# Patient Record
Sex: FEMALE | Race: WHITE | NOT HISPANIC OR LATINO | Employment: OTHER | URBAN - METROPOLITAN AREA
[De-identification: names, ages, dates, MRNs, and addresses within clinical notes are randomized per-mention and may not be internally consistent; named-entity substitution may affect disease eponyms.]

---

## 2017-09-05 ENCOUNTER — HOSPITAL ENCOUNTER (EMERGENCY)
Facility: OTHER | Age: 47
Discharge: HOME | End: 2017-09-05
Attending: EMERGENCY MEDICINE
Payer: MEDICARE

## 2017-09-05 ENCOUNTER — NURSE TRIAGE (OUTPATIENT)
Dept: ADMINISTRATIVE | Facility: CLINIC | Age: 47
End: 2017-09-05

## 2017-09-05 VITALS
OXYGEN SATURATION: 98 % | TEMPERATURE: 98 F | WEIGHT: 118 LBS | BODY MASS INDEX: 23.16 KG/M2 | DIASTOLIC BLOOD PRESSURE: 82 MMHG | RESPIRATION RATE: 17 BRPM | HEIGHT: 60 IN | SYSTOLIC BLOOD PRESSURE: 135 MMHG | HEART RATE: 92 BPM

## 2017-09-05 DIAGNOSIS — N83.209 HEMORRHAGIC OVARIAN CYST: Primary | ICD-10-CM

## 2017-09-05 LAB
B-HCG UR QL: NEGATIVE
BACTERIA #/AREA URNS HPF: ABNORMAL /HPF
BILIRUB UR QL STRIP: ABNORMAL
CLARITY UR: ABNORMAL
COLOR UR: YELLOW
CTP QC/QA: YES
GLUCOSE UR QL STRIP: NEGATIVE
HGB UR QL STRIP: ABNORMAL
HYALINE CASTS #/AREA URNS LPF: 5 /LPF
KETONES UR QL STRIP: NEGATIVE
LEUKOCYTE ESTERASE UR QL STRIP: ABNORMAL
MICROSCOPIC COMMENT: ABNORMAL
NITRITE UR QL STRIP: NEGATIVE
PH UR STRIP: 6 [PH] (ref 5–8)
PROT UR QL STRIP: ABNORMAL
RBC #/AREA URNS HPF: 1 /HPF (ref 0–4)
SP GR UR STRIP: >=1.03 (ref 1–1.03)
URN SPEC COLLECT METH UR: ABNORMAL
UROBILINOGEN UR STRIP-ACNC: NEGATIVE EU/DL
WBC #/AREA URNS HPF: 50 /HPF (ref 0–5)

## 2017-09-05 PROCEDURE — 99284 EMERGENCY DEPT VISIT MOD MDM: CPT | Mod: 25

## 2017-09-05 PROCEDURE — 63600175 PHARM REV CODE 636 W HCPCS: Performed by: EMERGENCY MEDICINE

## 2017-09-05 PROCEDURE — 81025 URINE PREGNANCY TEST: CPT | Performed by: PHYSICIAN ASSISTANT

## 2017-09-05 PROCEDURE — 81000 URINALYSIS NONAUTO W/SCOPE: CPT

## 2017-09-05 PROCEDURE — 25000003 PHARM REV CODE 250: Performed by: PHYSICIAN ASSISTANT

## 2017-09-05 PROCEDURE — 87086 URINE CULTURE/COLONY COUNT: CPT

## 2017-09-05 PROCEDURE — 87088 URINE BACTERIA CULTURE: CPT

## 2017-09-05 RX ORDER — ONDANSETRON 8 MG/1
8 TABLET, ORALLY DISINTEGRATING ORAL EVERY 8 HOURS PRN
Qty: 12 TABLET | Refills: 0 | Status: ON HOLD | OUTPATIENT
Start: 2017-09-05 | End: 2019-04-22

## 2017-09-05 RX ORDER — OXYCODONE HCL 5 MG/5 ML
10 SOLUTION, ORAL ORAL
Status: COMPLETED | OUTPATIENT
Start: 2017-09-05 | End: 2017-09-05

## 2017-09-05 RX ORDER — NYSTATIN 100000 U/G
CREAM TOPICAL 2 TIMES DAILY
Status: ON HOLD | COMMUNITY
End: 2019-04-22

## 2017-09-05 RX ORDER — OXYCODONE HYDROCHLORIDE 5 MG/1
10 TABLET ORAL
Status: DISCONTINUED | OUTPATIENT
Start: 2017-09-05 | End: 2017-09-05

## 2017-09-05 RX ORDER — IBUPROFEN 600 MG/1
600 TABLET ORAL
Status: DISCONTINUED | OUTPATIENT
Start: 2017-09-05 | End: 2017-09-05

## 2017-09-05 RX ORDER — PANTOPRAZOLE SODIUM 40 MG/1
40 TABLET, DELAYED RELEASE ORAL DAILY
COMMUNITY

## 2017-09-05 RX ORDER — METHOCARBAMOL 500 MG/1
1000 TABLET, FILM COATED ORAL
Status: COMPLETED | OUTPATIENT
Start: 2017-09-05 | End: 2017-09-05

## 2017-09-05 RX ORDER — OXYCODONE AND ACETAMINOPHEN 5; 325 MG/1; MG/1
2 TABLET ORAL
Status: DISCONTINUED | OUTPATIENT
Start: 2017-09-05 | End: 2017-09-05

## 2017-09-05 RX ORDER — TOLTERODINE 4 MG/1
4 CAPSULE, EXTENDED RELEASE ORAL DAILY
Status: ON HOLD | COMMUNITY
End: 2019-04-22

## 2017-09-05 RX ORDER — OXYCODONE HCL 5 MG/5 ML
10 SOLUTION, ORAL ORAL
Status: DISCONTINUED | OUTPATIENT
Start: 2017-09-05 | End: 2017-09-05

## 2017-09-05 RX ORDER — ACETAMINOPHEN 500 MG
500 TABLET ORAL
Status: COMPLETED | OUTPATIENT
Start: 2017-09-05 | End: 2017-09-05

## 2017-09-05 RX ADMIN — ACETAMINOPHEN 500 MG: 500 TABLET ORAL at 03:09

## 2017-09-05 RX ADMIN — METHOCARBAMOL 1000 MG: 500 TABLET ORAL at 03:09

## 2017-09-05 RX ADMIN — OXYCODONE HYDROCHLORIDE 10 MG: 5 SOLUTION ORAL at 08:09

## 2017-09-05 NOTE — ED PROVIDER NOTES
Encounter Date: 9/5/2017       History     Chief Complaint   Patient presents with    Back Pain     Pt CO bilateral LBP Xs 3.5 hours.     Patient is a 48 y/o white female with fibromyalgia, arthritis and Crohn's disease who presents to the ED with back pain onset 3 hours ago. She states she was watching TV when she moved a certain way and experienced a sharp pain in her lower back radiating to her right side. Her pain is exacerbated with bending over. She took her Oxycodone without relief. She denies fever, chills, dysuria, difficulty ambulating, leg dragging or loss of bowel bladder incontinence.       The history is provided by the patient.     Review of patient's allergies indicates:   Allergen Reactions    Imuran [azathioprine sodium] Other (See Comments)     pancreatitis    Penicillins Hives     Past Medical History:   Diagnosis Date    Arthritis     Crohn disease     Fibromyalgia      Past Surgical History:   Procedure Laterality Date    COLON SURGERY      ILEOSTOMY       No family history on file.  Social History   Substance Use Topics    Smoking status: Not on file    Smokeless tobacco: Not on file    Alcohol use Not on file     Review of Systems   Constitutional: Negative for chills and fever.   HENT: Negative for congestion and sore throat.    Eyes: Negative for visual disturbance.   Respiratory: Negative for shortness of breath.    Cardiovascular: Negative for chest pain.   Gastrointestinal: Negative for abdominal pain and nausea.   Genitourinary: Negative for dysuria and hematuria.   Musculoskeletal: Positive for back pain.   Skin: Negative for rash.   Neurological: Negative for weakness and light-headedness.   Hematological: Does not bruise/bleed easily.       Physical Exam     Initial Vitals [09/05/17 1511]   BP Pulse Resp Temp SpO2   124/75 (!) 113 18 98.8 °F (37.1 °C) 95 %      MAP       91.33         Physical Exam    Vitals reviewed.  Constitutional: She is cooperative.   White female  sitting upright in chair in no acute distress. Speaking in clear and full sentences and ambulates to the bed with ease.    HENT:   Head: Normocephalic and atraumatic.   Eyes: EOM are normal. Pupils are equal, round, and reactive to light.   Neck: Normal range of motion and full passive range of motion without pain. Neck supple.   Cardiovascular: Regular rhythm and intact distal pulses.   Tachycardic at 105 bpm   Pulmonary/Chest: Breath sounds normal. No respiratory distress. She has no wheezes.   Abdominal: Soft. Bowel sounds are normal. There is no tenderness.   Ileostomy stoma in place    Musculoskeletal:        Cervical back: Normal.   Normal ROM in all extremities. No cervical or thoracic tenderness. No stepoffs, masses or obvious trauma. Paraspinal tenderness to lumbar region radiating to rigt lower back. Right sided CVA tenderness    Neurological: She is alert and oriented to person, place, and time. GCS eye subscore is 4. GCS verbal subscore is 5. GCS motor subscore is 6.   CN II-XII intact. Normal finger to nose movement. Normal gait. Strength 5/5 in all extremities.    Skin: Skin is warm and dry. No rash noted.         ED Course   Procedures  Labs Reviewed   URINALYSIS - Abnormal; Notable for the following:        Result Value    Appearance, UA Hazy (*)     Specific Gravity, UA >=1.030 (*)     Protein, UA 1+ (*)     Bilirubin (UA) 1+ (*)     Occult Blood UA 1+ (*)     Leukocytes, UA Trace (*)     All other components within normal limits   URINALYSIS MICROSCOPIC - Abnormal; Notable for the following:     WBC, UA 50 (*)     Bacteria, UA Few (*)     Hyaline Casts, UA 5 (*)     All other components within normal limits   CULTURE, URINE   POCT URINE PREGNANCY          X-Rays:     Medical Decision Making:   Initial Assessment:   Urgent evaluation of a 47 y.o. female with a medical history of Crohn's disease, fibromyalgia, and arthritis, presenting to the emergency department complaining of back pain. Patient is  afebrile, nontoxic appearing and hemodynamically stable.  ED Management:  Patient exam is likely secondary to musculoskeletal strain vs pyelonephritis vs renal stone.   U/A revealed 1+ occult blood, trace leukocytes, 1 + protein, 50 RBC, few bacteria. Consistent with pyelonephritis given significant left flank pain. Will scan to rule out renal stone.   Upon reevaluation patient's pain is now 7/10  Ordered oral hydration for tachycardia.  I have discussed this patient with the attending and she will resume care from this point.  Plan is pending CT renal stone study  Other:   I have discussed this case with another health care provider.       <> Summary of the Discussion: MD Simona Whitney MD              Attending Attestation:     Physician Attestation Statement for NP/PA:   I have conducted a face to face encounter with this patient in addition to the NP/PA, due to Medical Complexity    Other NP/PA Attestation Additions:    History of Present Illness: Patient with LBP that is bilateral but worse in the right. No recent fever or illness.    Physical Exam: Normal gait. No acute neurologic deficits.    Medical Decision Making: Patient UA was suspicious for UTI but she denied dysuria or further  complaints. UCx was sent and is pending. Her CT and US revealed a large hemorrhagic left ovarian cyst which is likely the cause of her acute presentation. CT also showed DJD of lumbar tawana which matches patient's history of onset with trunk rotation. She wanted to go home and was stable at the time of discharge. She was stable for outpatient management.                  ED Course      Clinical Impression:     1. Hemorrhagic ovarian cyst                                 Jeevan Sood PA-C  09/05/17 1955       Simona Mejia MD  09/06/17 4743

## 2017-09-05 NOTE — ED TRIAGE NOTES
Pt with back pain and sudden onset this after noon. Pain is to bilateral lower sides. Denies any injury/trauma.

## 2017-09-05 NOTE — TELEPHONE ENCOUNTER
Reason for Disposition   SEVERE back pain    Protocols used: ST BACK PAIN-A-[place]    Rani is calling to report severe back pain that came on suddenly.  Reports it is on both sides of her spine.  No primary care MD.  Advised ER/UC.

## 2017-09-07 LAB — BACTERIA UR CULT: NORMAL

## 2017-09-19 NOTE — PROGRESS NOTES
9:34 AM call patient states that she is not feeling better.  Informed urine culture that grew diphtheroids.  She states that she has an appointment with her doctor tomorrow and would like to defer treatment to her PCP.  She was given return precautions.  She states understanding. ROBERTO FRAUSTO

## 2019-04-20 ENCOUNTER — HOSPITAL ENCOUNTER (INPATIENT)
Facility: OTHER | Age: 49
LOS: 4 days | Discharge: HOME | DRG: 871 | End: 2019-04-24
Attending: EMERGENCY MEDICINE | Admitting: INTERNAL MEDICINE
Payer: MEDICARE

## 2019-04-20 DIAGNOSIS — A41.9 SEPSIS: ICD-10-CM

## 2019-04-20 DIAGNOSIS — K65.1 INTRA-ABDOMINAL ABSCESS: ICD-10-CM

## 2019-04-20 DIAGNOSIS — R82.71 ASYMPTOMATIC BACTERIURIA: ICD-10-CM

## 2019-04-20 DIAGNOSIS — K65.1 ABSCESS OF ABDOMINAL CAVITY: ICD-10-CM

## 2019-04-20 DIAGNOSIS — K50.114 CROHN'S DISEASE OF LARGE INTESTINE WITH ABSCESS: ICD-10-CM

## 2019-04-20 DIAGNOSIS — R53.81 DEBILITY: ICD-10-CM

## 2019-04-20 DIAGNOSIS — A41.9 SEPSIS, DUE TO UNSPECIFIED ORGANISM: Primary | ICD-10-CM

## 2019-04-20 DIAGNOSIS — M79.7 FIBROMYALGIA: Chronic | ICD-10-CM

## 2019-04-20 LAB
ALBUMIN SERPL BCP-MCNC: 3.2 G/DL (ref 3.5–5.2)
ALP SERPL-CCNC: 79 U/L (ref 55–135)
ALT SERPL W/O P-5'-P-CCNC: 8 U/L (ref 10–44)
ANION GAP SERPL CALC-SCNC: 15 MMOL/L (ref 8–16)
ANISOCYTOSIS BLD QL SMEAR: SLIGHT
AST SERPL-CCNC: 15 U/L (ref 10–40)
BASOPHILS # BLD AUTO: ABNORMAL K/UL (ref 0–0.2)
BASOPHILS NFR BLD: 0 % (ref 0–1.9)
BILIRUB SERPL-MCNC: 0.3 MG/DL (ref 0.1–1)
BUN SERPL-MCNC: 6 MG/DL (ref 6–20)
CALCIUM SERPL-MCNC: 9.7 MG/DL (ref 8.7–10.5)
CHLORIDE SERPL-SCNC: 102 MMOL/L (ref 95–110)
CO2 SERPL-SCNC: 23 MMOL/L (ref 23–29)
CREAT SERPL-MCNC: 0.9 MG/DL (ref 0.5–1.4)
CRP SERPL-MCNC: 51.4 MG/L (ref 0–8.2)
DIFFERENTIAL METHOD: ABNORMAL
EOSINOPHIL # BLD AUTO: ABNORMAL K/UL (ref 0–0.5)
EOSINOPHIL NFR BLD: 0 % (ref 0–8)
ERYTHROCYTE [DISTWIDTH] IN BLOOD BY AUTOMATED COUNT: 15.5 % (ref 11.5–14.5)
EST. GFR  (AFRICAN AMERICAN): >60 ML/MIN/1.73 M^2
EST. GFR  (NON AFRICAN AMERICAN): >60 ML/MIN/1.73 M^2
GLUCOSE SERPL-MCNC: 114 MG/DL (ref 70–110)
HCT VFR BLD AUTO: 41.7 % (ref 37–48.5)
HGB BLD-MCNC: 13.6 G/DL (ref 12–16)
LACTATE SERPL-SCNC: 2.2 MMOL/L (ref 0.5–2.2)
LYMPHOCYTES # BLD AUTO: ABNORMAL K/UL (ref 1–4.8)
LYMPHOCYTES NFR BLD: 14 % (ref 18–48)
MAGNESIUM SERPL-MCNC: 2.6 MG/DL (ref 1.6–2.6)
MCH RBC QN AUTO: 32.5 PG (ref 27–31)
MCHC RBC AUTO-ENTMCNC: 32.6 G/DL (ref 32–36)
MCV RBC AUTO: 100 FL (ref 82–98)
MONOCYTES # BLD AUTO: ABNORMAL K/UL (ref 0.3–1)
MONOCYTES NFR BLD: 4 % (ref 4–15)
NEUTROPHILS NFR BLD: 74 % (ref 38–73)
NEUTS BAND NFR BLD MANUAL: 8 %
PLATELET # BLD AUTO: 383 K/UL (ref 150–350)
PLATELET BLD QL SMEAR: ABNORMAL
PMV BLD AUTO: 10.3 FL (ref 9.2–12.9)
POTASSIUM SERPL-SCNC: 3.5 MMOL/L (ref 3.5–5.1)
PROT SERPL-MCNC: 8.4 G/DL (ref 6–8.4)
RBC # BLD AUTO: 4.18 M/UL (ref 4–5.4)
SODIUM SERPL-SCNC: 140 MMOL/L (ref 136–145)
WBC # BLD AUTO: 18.91 K/UL (ref 3.9–12.7)

## 2019-04-20 PROCEDURE — 86140 C-REACTIVE PROTEIN: CPT

## 2019-04-20 PROCEDURE — S0030 INJECTION, METRONIDAZOLE: HCPCS | Performed by: EMERGENCY MEDICINE

## 2019-04-20 PROCEDURE — 12000002 HC ACUTE/MED SURGE SEMI-PRIVATE ROOM

## 2019-04-20 PROCEDURE — 96376 TX/PRO/DX INJ SAME DRUG ADON: CPT

## 2019-04-20 PROCEDURE — 96375 TX/PRO/DX INJ NEW DRUG ADDON: CPT

## 2019-04-20 PROCEDURE — 85027 COMPLETE CBC AUTOMATED: CPT

## 2019-04-20 PROCEDURE — 96365 THER/PROPH/DIAG IV INF INIT: CPT

## 2019-04-20 PROCEDURE — 80053 COMPREHEN METABOLIC PANEL: CPT

## 2019-04-20 PROCEDURE — 25000003 PHARM REV CODE 250: Performed by: EMERGENCY MEDICINE

## 2019-04-20 PROCEDURE — 85007 BL SMEAR W/DIFF WBC COUNT: CPT

## 2019-04-20 PROCEDURE — 63600175 PHARM REV CODE 636 W HCPCS: Performed by: EMERGENCY MEDICINE

## 2019-04-20 PROCEDURE — 99285 EMERGENCY DEPT VISIT HI MDM: CPT | Mod: 25

## 2019-04-20 PROCEDURE — 83735 ASSAY OF MAGNESIUM: CPT

## 2019-04-20 PROCEDURE — 36415 COLL VENOUS BLD VENIPUNCTURE: CPT

## 2019-04-20 PROCEDURE — 87040 BLOOD CULTURE FOR BACTERIA: CPT

## 2019-04-20 PROCEDURE — 83605 ASSAY OF LACTIC ACID: CPT

## 2019-04-20 RX ORDER — HYDROMORPHONE HYDROCHLORIDE 1 MG/ML
0.5 INJECTION, SOLUTION INTRAMUSCULAR; INTRAVENOUS; SUBCUTANEOUS
Status: COMPLETED | OUTPATIENT
Start: 2019-04-20 | End: 2019-04-20

## 2019-04-20 RX ORDER — GADOBUTROL 604.72 MG/ML
4.5 INJECTION INTRAVENOUS
Status: COMPLETED | OUTPATIENT
Start: 2019-04-21 | End: 2019-04-20

## 2019-04-20 RX ORDER — VANCOMYCIN HCL IN 5 % DEXTROSE 1G/250ML
20 PLASTIC BAG, INJECTION (ML) INTRAVENOUS ONCE
Status: DISCONTINUED | OUTPATIENT
Start: 2019-04-20 | End: 2019-04-20 | Stop reason: DRUGHIGH

## 2019-04-20 RX ORDER — METOCLOPRAMIDE HYDROCHLORIDE 5 MG/ML
10 INJECTION INTRAMUSCULAR; INTRAVENOUS
Status: COMPLETED | OUTPATIENT
Start: 2019-04-20 | End: 2019-04-20

## 2019-04-20 RX ORDER — METRONIDAZOLE 500 MG/100ML
500 INJECTION, SOLUTION INTRAVENOUS
Status: DISCONTINUED | OUTPATIENT
Start: 2019-04-20 | End: 2019-04-20 | Stop reason: RX

## 2019-04-20 RX ORDER — METRONIDAZOLE 500 MG/100ML
500 INJECTION, SOLUTION INTRAVENOUS ONCE
Status: COMPLETED | OUTPATIENT
Start: 2019-04-20 | End: 2019-04-20

## 2019-04-20 RX ADMIN — HYDROMORPHONE HYDROCHLORIDE 0.5 MG: 1 INJECTION, SOLUTION INTRAMUSCULAR; INTRAVENOUS; SUBCUTANEOUS at 09:04

## 2019-04-20 RX ADMIN — GADOBUTROL 4.5 ML: 604.72 INJECTION INTRAVENOUS at 11:04

## 2019-04-20 RX ADMIN — METOCLOPRAMIDE 10 MG: 5 INJECTION, SOLUTION INTRAMUSCULAR; INTRAVENOUS at 10:04

## 2019-04-20 RX ADMIN — METRONIDAZOLE 500 MG: 500 INJECTION, SOLUTION INTRAVENOUS at 10:04

## 2019-04-20 RX ADMIN — CEFTRIAXONE 2 G: 2 INJECTION, SOLUTION INTRAVENOUS at 09:04

## 2019-04-20 RX ADMIN — HYDROMORPHONE HYDROCHLORIDE 0.5 MG: 1 INJECTION, SOLUTION INTRAMUSCULAR; INTRAVENOUS; SUBCUTANEOUS at 10:04

## 2019-04-21 PROBLEM — R93.5 ABNORMAL FINDINGS ON DIAGNOSTIC IMAGING OF ABDOMEN: Status: ACTIVE | Noted: 2019-04-21

## 2019-04-21 PROBLEM — K50.10 CROHN'S DISEASE OF LARGE INTESTINE: Status: ACTIVE | Noted: 2019-04-21

## 2019-04-21 PROBLEM — K50.114 CROHN'S DISEASE OF LARGE INTESTINE WITH ABSCESS: Status: ACTIVE | Noted: 2019-04-21

## 2019-04-21 PROBLEM — N30.00 ACUTE CYSTITIS WITHOUT HEMATURIA: Status: ACTIVE | Noted: 2019-04-21

## 2019-04-21 LAB
ALBUMIN SERPL BCP-MCNC: 2.4 G/DL (ref 3.5–5.2)
ALP SERPL-CCNC: 60 U/L (ref 55–135)
ALT SERPL W/O P-5'-P-CCNC: 6 U/L (ref 10–44)
ANION GAP SERPL CALC-SCNC: 9 MMOL/L (ref 8–16)
ANION GAP SERPL CALC-SCNC: 9 MMOL/L (ref 8–16)
AST SERPL-CCNC: 11 U/L (ref 10–40)
BACTERIA #/AREA URNS HPF: ABNORMAL /HPF
BASOPHILS # BLD AUTO: 0.03 K/UL (ref 0–0.2)
BASOPHILS # BLD AUTO: 0.03 K/UL (ref 0–0.2)
BASOPHILS NFR BLD: 0.4 % (ref 0–1.9)
BASOPHILS NFR BLD: 0.4 % (ref 0–1.9)
BILIRUB SERPL-MCNC: 0.2 MG/DL (ref 0.1–1)
BILIRUB UR QL STRIP: NEGATIVE
BUN SERPL-MCNC: 4 MG/DL (ref 6–20)
BUN SERPL-MCNC: 4 MG/DL (ref 6–20)
CALCIUM SERPL-MCNC: 8.1 MG/DL (ref 8.7–10.5)
CALCIUM SERPL-MCNC: 8.1 MG/DL (ref 8.7–10.5)
CHLORIDE SERPL-SCNC: 106 MMOL/L (ref 95–110)
CHLORIDE SERPL-SCNC: 106 MMOL/L (ref 95–110)
CLARITY UR: CLEAR
CO2 SERPL-SCNC: 23 MMOL/L (ref 23–29)
CO2 SERPL-SCNC: 23 MMOL/L (ref 23–29)
COLOR UR: YELLOW
CREAT SERPL-MCNC: 0.7 MG/DL (ref 0.5–1.4)
CREAT SERPL-MCNC: 0.7 MG/DL (ref 0.5–1.4)
DIFFERENTIAL METHOD: ABNORMAL
DIFFERENTIAL METHOD: ABNORMAL
EOSINOPHIL # BLD AUTO: 0.1 K/UL (ref 0–0.5)
EOSINOPHIL # BLD AUTO: 0.1 K/UL (ref 0–0.5)
EOSINOPHIL NFR BLD: 1.2 % (ref 0–8)
EOSINOPHIL NFR BLD: 1.2 % (ref 0–8)
ERYTHROCYTE [DISTWIDTH] IN BLOOD BY AUTOMATED COUNT: 15.4 % (ref 11.5–14.5)
ERYTHROCYTE [DISTWIDTH] IN BLOOD BY AUTOMATED COUNT: 15.4 % (ref 11.5–14.5)
EST. GFR  (AFRICAN AMERICAN): >60 ML/MIN/1.73 M^2
EST. GFR  (AFRICAN AMERICAN): >60 ML/MIN/1.73 M^2
EST. GFR  (NON AFRICAN AMERICAN): >60 ML/MIN/1.73 M^2
EST. GFR  (NON AFRICAN AMERICAN): >60 ML/MIN/1.73 M^2
GLUCOSE SERPL-MCNC: 88 MG/DL (ref 70–110)
GLUCOSE SERPL-MCNC: 88 MG/DL (ref 70–110)
GLUCOSE UR QL STRIP: NEGATIVE
HCT VFR BLD AUTO: 32.8 % (ref 37–48.5)
HCT VFR BLD AUTO: 32.8 % (ref 37–48.5)
HGB BLD-MCNC: 10.9 G/DL (ref 12–16)
HGB BLD-MCNC: 10.9 G/DL (ref 12–16)
HGB UR QL STRIP: ABNORMAL
HYALINE CASTS #/AREA URNS LPF: 2 /LPF
KETONES UR QL STRIP: NEGATIVE
LACTATE SERPL-SCNC: 2.1 MMOL/L (ref 0.5–2.2)
LEUKOCYTE ESTERASE UR QL STRIP: ABNORMAL
LYMPHOCYTES # BLD AUTO: 1.5 K/UL (ref 1–4.8)
LYMPHOCYTES # BLD AUTO: 1.5 K/UL (ref 1–4.8)
LYMPHOCYTES NFR BLD: 18.6 % (ref 18–48)
LYMPHOCYTES NFR BLD: 18.6 % (ref 18–48)
MCH RBC QN AUTO: 32.5 PG (ref 27–31)
MCH RBC QN AUTO: 32.5 PG (ref 27–31)
MCHC RBC AUTO-ENTMCNC: 33.2 G/DL (ref 32–36)
MCHC RBC AUTO-ENTMCNC: 33.2 G/DL (ref 32–36)
MCV RBC AUTO: 98 FL (ref 82–98)
MCV RBC AUTO: 98 FL (ref 82–98)
MICROSCOPIC COMMENT: ABNORMAL
MONOCYTES # BLD AUTO: 0.8 K/UL (ref 0.3–1)
MONOCYTES # BLD AUTO: 0.8 K/UL (ref 0.3–1)
MONOCYTES NFR BLD: 9.5 % (ref 4–15)
MONOCYTES NFR BLD: 9.5 % (ref 4–15)
NEUTROPHILS # BLD AUTO: 5.8 K/UL (ref 1.8–7.7)
NEUTROPHILS # BLD AUTO: 5.8 K/UL (ref 1.8–7.7)
NEUTROPHILS NFR BLD: 70.1 % (ref 38–73)
NEUTROPHILS NFR BLD: 70.1 % (ref 38–73)
NITRITE UR QL STRIP: POSITIVE
PH UR STRIP: 6 [PH] (ref 5–8)
PLATELET # BLD AUTO: 283 K/UL (ref 150–350)
PLATELET # BLD AUTO: 283 K/UL (ref 150–350)
PMV BLD AUTO: 9.6 FL (ref 9.2–12.9)
PMV BLD AUTO: 9.6 FL (ref 9.2–12.9)
POTASSIUM SERPL-SCNC: 3.3 MMOL/L (ref 3.5–5.1)
POTASSIUM SERPL-SCNC: 3.3 MMOL/L (ref 3.5–5.1)
PROT SERPL-MCNC: 5.8 G/DL (ref 6–8.4)
PROT UR QL STRIP: ABNORMAL
RBC # BLD AUTO: 3.35 M/UL (ref 4–5.4)
RBC # BLD AUTO: 3.35 M/UL (ref 4–5.4)
RBC #/AREA URNS HPF: 10 /HPF (ref 0–4)
SODIUM SERPL-SCNC: 138 MMOL/L (ref 136–145)
SODIUM SERPL-SCNC: 138 MMOL/L (ref 136–145)
SP GR UR STRIP: 1.02 (ref 1–1.03)
URN SPEC COLLECT METH UR: ABNORMAL
UROBILINOGEN UR STRIP-ACNC: NEGATIVE EU/DL
WBC # BLD AUTO: 8.21 K/UL (ref 3.9–12.7)
WBC # BLD AUTO: 8.21 K/UL (ref 3.9–12.7)
WBC #/AREA URNS HPF: 75 /HPF (ref 0–5)

## 2019-04-21 PROCEDURE — 25000003 PHARM REV CODE 250: Performed by: INTERNAL MEDICINE

## 2019-04-21 PROCEDURE — S0030 INJECTION, METRONIDAZOLE: HCPCS | Performed by: PHYSICIAN ASSISTANT

## 2019-04-21 PROCEDURE — 25000003 PHARM REV CODE 250: Performed by: EMERGENCY MEDICINE

## 2019-04-21 PROCEDURE — 99223 PR INITIAL HOSPITAL CARE,LEVL III: ICD-10-PCS | Mod: ,,, | Performed by: PHYSICIAN ASSISTANT

## 2019-04-21 PROCEDURE — 99223 1ST HOSP IP/OBS HIGH 75: CPT | Mod: ,,, | Performed by: PHYSICIAN ASSISTANT

## 2019-04-21 PROCEDURE — A9585 GADOBUTROL INJECTION: HCPCS | Performed by: EMERGENCY MEDICINE

## 2019-04-21 PROCEDURE — 80053 COMPREHEN METABOLIC PANEL: CPT

## 2019-04-21 PROCEDURE — 25000003 PHARM REV CODE 250: Performed by: PHYSICIAN ASSISTANT

## 2019-04-21 PROCEDURE — 25500020 PHARM REV CODE 255: Performed by: EMERGENCY MEDICINE

## 2019-04-21 PROCEDURE — 94761 N-INVAS EAR/PLS OXIMETRY MLT: CPT

## 2019-04-21 PROCEDURE — 85025 COMPLETE CBC W/AUTO DIFF WBC: CPT

## 2019-04-21 PROCEDURE — 63600175 PHARM REV CODE 636 W HCPCS: Performed by: INTERNAL MEDICINE

## 2019-04-21 PROCEDURE — 11000001 HC ACUTE MED/SURG PRIVATE ROOM

## 2019-04-21 PROCEDURE — 63600175 PHARM REV CODE 636 W HCPCS: Performed by: PHYSICIAN ASSISTANT

## 2019-04-21 PROCEDURE — C9113 INJ PANTOPRAZOLE SODIUM, VIA: HCPCS | Performed by: PHYSICIAN ASSISTANT

## 2019-04-21 PROCEDURE — 87086 URINE CULTURE/COLONY COUNT: CPT

## 2019-04-21 PROCEDURE — 83605 ASSAY OF LACTIC ACID: CPT

## 2019-04-21 PROCEDURE — 81000 URINALYSIS NONAUTO W/SCOPE: CPT

## 2019-04-21 PROCEDURE — 63600175 PHARM REV CODE 636 W HCPCS: Performed by: EMERGENCY MEDICINE

## 2019-04-21 PROCEDURE — 36415 COLL VENOUS BLD VENIPUNCTURE: CPT

## 2019-04-21 RX ORDER — SODIUM CHLORIDE 9 MG/ML
INJECTION, SOLUTION INTRAVENOUS CONTINUOUS
Status: DISCONTINUED | OUTPATIENT
Start: 2019-04-21 | End: 2019-04-24 | Stop reason: HOSPADM

## 2019-04-21 RX ORDER — METRONIDAZOLE 500 MG/100ML
500 INJECTION, SOLUTION INTRAVENOUS
Status: DISCONTINUED | OUTPATIENT
Start: 2019-04-21 | End: 2019-04-24

## 2019-04-21 RX ORDER — PANTOPRAZOLE SODIUM 40 MG/10ML
40 INJECTION, POWDER, LYOPHILIZED, FOR SOLUTION INTRAVENOUS DAILY
Status: DISCONTINUED | OUTPATIENT
Start: 2019-04-21 | End: 2019-04-24 | Stop reason: HOSPADM

## 2019-04-21 RX ORDER — ONDANSETRON 2 MG/ML
4 INJECTION INTRAMUSCULAR; INTRAVENOUS EVERY 8 HOURS PRN
Status: DISCONTINUED | OUTPATIENT
Start: 2019-04-21 | End: 2019-04-24 | Stop reason: HOSPADM

## 2019-04-21 RX ORDER — VANCOMYCIN HCL IN 5 % DEXTROSE 1G/250ML
1000 PLASTIC BAG, INJECTION (ML) INTRAVENOUS EVERY 24 HOURS
Status: DISCONTINUED | OUTPATIENT
Start: 2019-04-22 | End: 2019-04-23

## 2019-04-21 RX ORDER — HYDROMORPHONE HYDROCHLORIDE 1 MG/ML
0.5 INJECTION, SOLUTION INTRAMUSCULAR; INTRAVENOUS; SUBCUTANEOUS ONCE
Status: COMPLETED | OUTPATIENT
Start: 2019-04-21 | End: 2019-04-21

## 2019-04-21 RX ORDER — HYDROMORPHONE HYDROCHLORIDE 1 MG/ML
0.5 INJECTION, SOLUTION INTRAMUSCULAR; INTRAVENOUS; SUBCUTANEOUS EVERY 6 HOURS PRN
Status: DISCONTINUED | OUTPATIENT
Start: 2019-04-21 | End: 2019-04-21

## 2019-04-21 RX ORDER — POTASSIUM CHLORIDE 20 MEQ/15ML
40 SOLUTION ORAL ONCE
Status: COMPLETED | OUTPATIENT
Start: 2019-04-21 | End: 2019-04-21

## 2019-04-21 RX ORDER — ACETAMINOPHEN 325 MG/1
650 TABLET ORAL EVERY 4 HOURS PRN
Status: DISCONTINUED | OUTPATIENT
Start: 2019-04-21 | End: 2019-04-24 | Stop reason: HOSPADM

## 2019-04-21 RX ORDER — SODIUM CHLORIDE 0.9 % (FLUSH) 0.9 %
10 SYRINGE (ML) INJECTION
Status: DISCONTINUED | OUTPATIENT
Start: 2019-04-21 | End: 2019-04-24 | Stop reason: HOSPADM

## 2019-04-21 RX ORDER — HYDROMORPHONE HYDROCHLORIDE 1 MG/ML
1 INJECTION, SOLUTION INTRAMUSCULAR; INTRAVENOUS; SUBCUTANEOUS EVERY 6 HOURS PRN
Status: DISCONTINUED | OUTPATIENT
Start: 2019-04-21 | End: 2019-04-22

## 2019-04-21 RX ADMIN — HYDROMORPHONE HYDROCHLORIDE 0.5 MG: 1 INJECTION, SOLUTION INTRAMUSCULAR; INTRAVENOUS; SUBCUTANEOUS at 01:04

## 2019-04-21 RX ADMIN — POTASSIUM CHLORIDE 40 MEQ: 40 SOLUTION ORAL at 08:04

## 2019-04-21 RX ADMIN — PANTOPRAZOLE SODIUM 40 MG: 40 INJECTION, POWDER, FOR SOLUTION INTRAVENOUS at 08:04

## 2019-04-21 RX ADMIN — HYDROMORPHONE HYDROCHLORIDE 0.5 MG: 1 INJECTION, SOLUTION INTRAMUSCULAR; INTRAVENOUS; SUBCUTANEOUS at 02:04

## 2019-04-21 RX ADMIN — SODIUM CHLORIDE 1500 ML: 0.9 INJECTION, SOLUTION INTRAVENOUS at 09:04

## 2019-04-21 RX ADMIN — HYDROMORPHONE HYDROCHLORIDE 1 MG: 1 INJECTION, SOLUTION INTRAMUSCULAR; INTRAVENOUS; SUBCUTANEOUS at 06:04

## 2019-04-21 RX ADMIN — METRONIDAZOLE 500 MG: 500 INJECTION, SOLUTION INTRAVENOUS at 08:04

## 2019-04-21 RX ADMIN — SODIUM CHLORIDE: 0.9 INJECTION, SOLUTION INTRAVENOUS at 03:04

## 2019-04-21 RX ADMIN — VANCOMYCIN HYDROCHLORIDE 1250 MG: 100 INJECTION, POWDER, LYOPHILIZED, FOR SOLUTION INTRAVENOUS at 01:04

## 2019-04-21 RX ADMIN — METRONIDAZOLE 500 MG: 500 INJECTION, SOLUTION INTRAVENOUS at 04:04

## 2019-04-21 RX ADMIN — CEFTRIAXONE 1 G: 1 INJECTION, SOLUTION INTRAVENOUS at 09:04

## 2019-04-21 NOTE — ASSESSMENT & PLAN NOTE
- Ms. Rani Carvajal is admitted to inpatient status  - source is Crohn's flare w/ suspected abdominal abscess  - lactic acid

## 2019-04-21 NOTE — PROGRESS NOTES
Pharmacokinetic Initial Assessment: IV Vancomycin    Assessment/Plan:    Initiate intravenous vancomycin with loading dose of 1250   mg once followed by a maintenance dose of vancomycin 1000mg IV every 24 hours  Desired empiric serum trough concentration is 10 to 20 mcg/mL.  Draw vancomycin trough level 30 min prior to third dose on 4/23/19 at approximately 0130  Pharmacy will continue to follow and monitor vancomycin.      Please contact pharmacy at extension [phone] with any questions regarding this assessment.     Thank you for the consult,   Anton Gallo     Patient brief summary:  Rani Carvajal is a 48 y.o. female initiated on antimicrobial therapy with IV Vancomycin for treatment of suspected sepsis    Drug Allergies:   Review of patient's allergies indicates:   Allergen Reactions    Imuran [azathioprine sodium] Other (See Comments)     pancreatitis    Penicillins Hives    Sulfa (sulfonamide antibiotics) Nausea Only and Rash       Actual Body Weight:   47.2kg      Renal Function:   Estimated Creatinine Clearance: 56.1 mL/min (based on SCr of 0.9 mg/dL).,     Dialysis Method (if applicable):  n/a    CBC (last 72 hours):  Recent Labs   Lab Result Units 04/18/19  1612 04/20/19  2034   WBC K/uL 13.07* 18.91*   Hemoglobin g/dL 11.5* 13.6   Hematocrit % 34.7* 41.7   Platelets K/uL 271 383*   Gran% % 86.5* 74.0*   Lymph% % 5.0* 14.0*   Mono% % 7.7 4.0   Eosinophil% % 0.2 0.0   Basophil% % 0.2 0.0   Differential Method  Automated Manual       Metabolic Panel (last 72 hours):  Recent Labs   Lab Result Units 04/18/19  1540 04/18/19  1612 04/20/19  2034   Sodium mmol/L  --  133* 140   Potassium mmol/L  --  3.4* 3.5   Chloride mmol/L  --  100 102   CO2 mmol/L  --  23 23   Glucose mg/dL  --  143* 114*   Glucose, UA  Negative  --   --    BUN, Bld mg/dL  --  12 6   Creatinine mg/dL  --  0.9 0.9   Albumin g/dL  --  2.6* 3.2*   Total Bilirubin mg/dL  --  0.2 0.3   Alkaline Phosphatase U/L  --  76 79   AST U/L  --  8* 15    ALT U/L  --  5* 8*   Magnesium mg/dL  --   --  2.6       Drug levels (last 3 results):  No results for input(s): VANCOMYCINRA, VANCOMYCINPE, VANCOMYCINTR in the last 72 hours.    Microbiologic Results:  Microbiology Results (last 7 days)     Procedure Component Value Units Date/Time    Blood culture x two cultures. Draw prior to antibiotics. [[id number]] Collected:  04/20/19 2102    Order Status:  Sent Specimen:  Blood from Peripheral, Antecubital, Right Updated:  04/20/19 2103    Blood culture x two cultures. Draw prior to antibiotics. [[id number]] Collected:  04/20/19 2032    Order Status:  Sent Specimen:  Blood from Peripheral, Antecubital, Left Updated:  04/20/19 2039

## 2019-04-21 NOTE — HPI
Ms. Carvajal is a 48 year old woman with Crohn's disease who presented with one week of abdominal pain associated with subjective fever, nausea, vomiting and headache.  She slept all day on the day before this admission.  She had been seen in the ED 2 days before that, and a CT showed a possible abscess.  Admission was recommended but patient signed out AMA in order to take care of her cat.  Patient has an ileostomy and her stools have been normal.  Workup in the ED on 4/18 included a CT that showed a possible small abscess or cyst in the pelvis with adjacent inflammatory changes.  A follow up pelvic ultrasound was unrevealing.  Vitals were normal except for pulse 117.  Labs showed mild hypokalemia.  She was admitted for treatment of possible abdominal abscess.

## 2019-04-21 NOTE — NURSING
Patient arrived to floor from MRI with flagyl to dial-a-flow, antibiotic not infused. Antibiotic placed on pump and initiated. Vancomycin to be administered upon completion. Will continue to monitor.

## 2019-04-21 NOTE — ASSESSMENT & PLAN NOTE
- imaging is concerning for associated pelvic abscess vs. Complex Cyst    - follow up US was inconclusive   - follow up MRI pending   - GI consulted, surgery consulted   - continue IV antibiotics   - monitor

## 2019-04-21 NOTE — ED PROVIDER NOTES
"Encounter Date: 4/20/2019    SCRIBE #1 NOTE: I, Keegan Torres, am scribing for, and in the presence of, Dr. Brunson.       History     Chief Complaint   Patient presents with    returned for admission     reports being seen 2 days and diagnosed with possible abscess to abd and UTI.     Time seen by provider: 8:30 PM    This is a 48 y.o. female who presents with complaint of abdominal pain that began approximately one week ago. She reports that she is also experiencing subjective fever, nausea, vomiting, and an intermittent headache. The patient was seen on 4/18/19 for similar symptoms and reports that they diagnosed her with a "possible abdominal abscess". She reports that she was unable to stay that day because she had to take care of her cat. She reports that the pain has been worsening since onset.  Pain is severe in degree.  Associated with malaise (patient slept all day yesterday), generalized weakness.  She denies vaginal bleeding, dysuria.     The history is provided by the patient and medical records.     Review of patient's allergies indicates:   Allergen Reactions    Imuran [azathioprine sodium] Other (See Comments)     pancreatitis    Penicillins Hives    Sulfa (sulfonamide antibiotics) Nausea Only and Rash     Past Medical History:   Diagnosis Date    Arthritis     Crohn disease     Fibromyalgia      Past Surgical History:   Procedure Laterality Date    ABDOMINAL SURGERY      COLON SURGERY      ILEOSTOMY       No family history on file.  Social History     Tobacco Use    Smoking status: Current Every Day Smoker     Types: Cigarettes    Smokeless tobacco: Current User   Substance Use Topics    Alcohol use: Never     Frequency: Never    Drug use: Never     ROS: As per HPI and below:   General: No fever.   HENT: No facial pain.   Eyes: Negative for eye pain. No change in vision.    Cardiovascular: No chest pain.   Respiratory:  No dyspnea.  No cough.  GI: No abdominal pain. No nausea. No " vomiting. No diarrhea.   :  No dysuria.  No vaginal bleeding.  Skin: No rashes.   Neuro:  No syncope.  No focal deficits.   Musculoskeletal: No extremity pain.  All other systems reviewed and are negative.      Physical Exam     Initial Vitals [04/20/19 1958]   BP Pulse Resp Temp SpO2   127/70 (!) 117 20 98.5 °F (36.9 °C) 98 %      MAP       --         Nursing note and vitals reviewed.  /66   Pulse 82   Temp 99.5 °F (37.5 °C) (Oral)   Resp 17   Ht 5' (1.524 m)   Wt 47.2 kg (104 lb)   LMP  (LMP Unknown)   SpO2 99%   BMI 20.31 kg/m²   Constitutional: Mildly disheveled. Thin and frail. Appears much older than stated age. AAOx3. Uncomfortable. .  Eyes: EOMI. No discharge. Anicteric.  HENT:   Mouth/Throat: Oropharynx is clear. Uvula midline.  Poor dentition. Dry mucous membranes.  Neck: Normal range of motion. Neck supple.  Cardiovascular: Tachycardia. No murmur, no gallop and no friction rub heard.   Pulmonary/Chest: No respiratory distress. Effort normal. No wheezes, no rales, no rhonchi  Abdominal: Bowel sounds normal. Soft. No distension and no mass. Diffuse abdominal tenderness. Ileostomy in place with with appropriate appearing brown liquid stool. There is no rebound, no guarding.   Musculoskeletal: Normal range of motion. No CVA tenderness.  Neurological: GCS 15. Alert and oriented to person, place, and time. No gross cranial nerve, light touch or strength deficit. Coordination normal.   Skin: Skin is warm and dry.   Psychiatric: Behavior is normal. Judgment normal.      ED Course   Procedures   Critical Care Times:   ==============================================================  · Total Critical Care Time - exclusive of procedural time: 45 minutes.    Critical Care Comments:    Critical care was necessary to treat or prevent imminent or life-threatening deterioration of the following conditions:  Sepsis     Critical care was time spent personally by me on the following activities:   * Obtaining  history from patient, family, EMS, PCP, etc;  * Direct Patient Care (initial evaluation, reassessments, and time considering the case);  * Review of old charts;   * Ordering, Reviewing, and Interpreting Diagnostic Studies (labs, imaging, and/or EKG);   * Development of treatment plan with patient or relative;   * Discussion with consultants  * Documentation  *   ==============================================================    Labs Reviewed   CBC W/ AUTO DIFFERENTIAL - Abnormal; Notable for the following components:       Result Value    WBC 18.91 (*)      (*)     MCH 32.5 (*)     RDW 15.5 (*)     Platelets 383 (*)     Gran% 74.0 (*)     Lymph% 14.0 (*)     Platelet Estimate Increased (*)     All other components within normal limits   COMPREHENSIVE METABOLIC PANEL - Abnormal; Notable for the following components:    Glucose 114 (*)     Albumin 3.2 (*)     ALT 8 (*)     All other components within normal limits   C-REACTIVE PROTEIN - Abnormal; Notable for the following components:    CRP 51.4 (*)     All other components within normal limits   CULTURE, BLOOD   CULTURE, BLOOD   LACTIC ACID, PLASMA    Narrative:     Recoll. [id number] by [facility] at 04/20/2019 21:19, reason: hemolyzed   MAGNESIUM   URINALYSIS, REFLEX TO URINE CULTURE   LACTIC ACID, PLASMA          Imaging Results          MRI Abdomen-Pelvis w w/o Contrast (XPD) (In process)                X-Ray Chest AP Portable (Final result)  Result time 04/20/19 21:33:05    Final result by Taylor Moe MD (04/20/19 21:33:05)                 Impression:      No failure or pneumonia or nodule.      Electronically signed by: Taylor Moe  Date:    04/20/2019  Time:    21:33             Narrative:    EXAMINATION:  XR CHEST AP PORTABLE    CLINICAL HISTORY:  Sepsis;    TECHNIQUE:  Single frontal view of the chest was performed.    COMPARISON:  None    FINDINGS:  Single portable view presented.  Patient is rotated towards the left.  There is probably a left  subclavian catheter terminating at the inferior aspect of the SVC.  No pneumothorax or pleural effusion or interstitial edema consolidation or nodular cavitary lesion.  The heart is probably normal size for technique.  There is mild gas filling of the stomach.  No abdominal free air.  No fracture.                                 Medical Decision Making:   Independently Interpreted Test(s):   I have ordered and independently interpreted X-rays - see prior notes.  Clinical Tests:   Lab Tests: Ordered and Reviewed  Radiological Study: Ordered and Reviewed            Scribe Attestation:   Scribe #1: I performed the above scribed service and the documentation accurately describes the services I performed. I attest to the accuracy of the note.    Attending Attestation:           Physician Attestation for Scribe:  Physician Attestation Statement for Scribe #1: I, Dr. Brunson, reviewed documentation, as scribed by Keegan Torres  in my presence, and it is both accurate and complete.                 ED Course as of Apr 20 2356   Sat Apr 20, 2019   2135 I independently reviewed and interpreted labs which are notable for leukocytosis with WBC 19k, which is increased from prior visit. This seems to be in part due to hemoconcentration. Lactic acid elevated. Tachycardia has resolved. Paging general surgery.   Sepsis Perfusion Assessment: I attest, a sepsis perfusion exam was performed within 6 hours of sepsis presentation, following fluid resuscitation.      [RC]   2149 Pt history, findings, and results discussed with general surgeon Dr. Hoff who accepts the pt. He requests and MRI and admission to hospitalist.       [MM]   2208 Pt history, findings, results, discussed with  Shanita Newman, who will admit the patient to Dr. Cordon.    [MM]   2257 Lactic acid, plasma #1 [RC]      ED Course User Index  [MM] Keegan Torres  [RC] Aayush Brunson MD     Clinical Impression:     1. Sepsis, due to unspecified organism    2.  Intra-abdominal abscess    3. Crohn's disease of large intestine with abscess                                   Aayush Brunson MD  04/20/19 9090

## 2019-04-21 NOTE — CONSULTS
[facility]  Consult Note      Date of Consultation:4/21/2019    Reason for Consult:  Abdominal pain  SUBJECTIVE:     History of Present Illness:  The patient is a 48-year-old female with a long history of Crohn's disease and 4 days of abdominal pain. The patient was seen in the emergency room on 04/18/2019 and a CT scan of the abdomen suggested intra-abdominal abscess.  Due to compelling personal reasons the patient refused admission and presented today with abdominal pain and weakness.  The patient also complained of fever and chills.  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  The patient still has the rectum.  The patient has received most of her care in [place] where her primary care physician is located.  She is taking no medications for Crohn's disease at this point in time.    Review of patient's allergies indicates:   Allergen Reactions    Imuran [azathioprine sodium] Other (See Comments)     pancreatitis    Penicillins Hives    Sulfa (sulfonamide antibiotics) Nausea Only and Rash       Past Medical History:   Diagnosis Date    Arthritis     Crohn disease     Fibromyalgia      Past Surgical History:   Procedure Laterality Date    ABDOMINAL SURGERY      COLON SURGERY      ILEOSTOMY       History reviewed. No pertinent family history.  Social History     Tobacco Use    Smoking status: Current Every Day Smoker     Packs/day: 0.25     Types: Cigarettes    Smokeless tobacco: Current User   Substance Use Topics    Alcohol use: Never     Frequency: Never    Drug use: Never            Physical Exam:  General:  The patient is a well-developed well-nourished female in no acute distress  Neck:  Supple, trachea midline  Chest:  Clear  Cor:  Regular rate and rhythm  Abdomen:  Soft, mild lower abdominal tenderness, no distention, positive bowel sounds, ileostomy which is  functional and appears healthy  Extremities:  No tenderness, no edema  Neuro:  Grossly intact    IMAGING:  MRI Abdomen-Pelvis w w/o Contrast (XPD)   Status: Final result   MyChart Results Release     MyChart Status: Code Exp  Results Release   PACS Images for [employer] Viewer      Show images for MRI Abdomen-Pelvis w w/o Contrast (XPD)   PACS Images for ViTAL [place] Viewer (Includes [facility] Images)      Show images for MRI Abdomen-Pelvis w w/o Contrast (XPD)   MRI Abdomen-Pelvis w w/o Contrast (XPD)   Order: [id number]   Status:  Final result   Visible to patient:  No (Not Released) Next appt:  None   Details     Reading Physician Reading Date Result Priority   Tee Langford MD 4/21/2019       Narrative     EXAMINATION:  MRI ABDOMEN-PELVIS W W/O CONTRAST (XPD)    CLINICAL HISTORY:  Sepsis;   history of Crohn's disease and question of intra-abdominal abscess.    TECHNIQUE:  Multiplanar multisequence images of the abdomen and pelvis before and after administration of 10 mL Gadavist intravenous contrast.    COMPARISON:  Pelvic ultrasound 04/18/2019.  CT of the abdomen and pelvis 04/18/2019.    FINDINGS:  Examination is overall mildly degraded related to motion artifact.    Inferior Thorax: Normal.    Liver: No focal lesions.    Gallbladder: No gallstones.    Bile Ducts: No dilatation.    Pancreas: No mass. No peripancreatic fat stranding.    Spleen: Normal.    Adrenals: Normal.    Kidneys/Ureters: Normal enhancement.  No mass or hydroureteronephrosis.    Bladder: There is irregular asymmetric right-sided bladder wall thickening, noting that the right superolateral margin of the bladder abuts the known fluid and air collection in the right lower quadrant.  Fluid and air collection is further described below with GI tract.    GI Tract/Mesentery: Stomach is distended with fluid and air.  Remainder of bowel loops are relatively nondistended.  Left lower quadrant ileostomy is present.  Little to no  remaining colon is identifiable, though surgical history EMR does not provide details of prior abdominal surgery.  There is apparent blind ending rectal pouch extending up to the region of the right hemipelvis and terminating just below the previously described right lower quadrant fluid collection.  The fluid and air collection in the right lower quadrant has a somewhat thickened surrounding enhancing wall apposing multiple adjacent loops of small bowel along its anterior/superior margin and the blind-ending rectal pouch along its inferior margin.  This collection, including the thickened enhancing rim measures approximately 5.3 x 3.0 x 3.2 cm.    Reproductive organs: Ovaries are not clearly delineated.  Uterus appears normal to slightly diminutive and is situated along the inferior margin of the aforementioned rim enhancing fluid and air collection.    Peritoneal Space: No ascites or free air.    Retroperitoneum: No pathologically enlarged nodes.    Abdominal wall: Small right lower quadrant spigelian type hernia is present containing a loop of bowel without obstruction.  Left lower quadrant ileostomy is present with associated parastomal hernia.    Vasculature: No aneurysm.    Bones: Normal marrow signal.  Bilateral sacral nerve root sleeve cysts are present.      Impression       Overall mildly degraded examination related to motion artifact.    Unchanged size and appearance of likely abscess in the right lower quadrant, apposing multiple adjacent loops of small bowel along its anterior/superior margin, and the blind-ending rectal pouch, the uterus, and the right superolateral margin of the bladder along its inferior margin.  If further characterization or assessment for 6 to this tract is required, dedicated MR enterography could be performed.    Apparent postsurgical findings of total colectomy with blind-ending rectal pouch and left lower quadrant ileostomy.  Parastomal hernia is present.    Asymmetric  right-sided bladder wall thickening may be reactive related to nearby abscess.    Small right lower quadrant spigelian type hernia containing loop of bowel without obstruction.      Electronically signed by: Tee Langford MD  Date: 04/21/2019  Time: 11:17             Last Resulted: 04/21/19 11:17 Order Details View Encounter Lab and Collection Details Routing Result History            External Result Report     External Result Report   Narrative     EXAMINATION:  MRI ABDOMEN-PELVIS W W/O CONTRAST (XPD)    CLINICAL HISTORY:  Sepsis;   history of Crohn's disease and question of intra-abdominal abscess.    TECHNIQUE:  Multiplanar multisequence images of the abdomen and pelvis before and after administration of 10 mL Gadavist intravenous contrast.    COMPARISON:  Pelvic ultrasound 04/18/2019.  CT of the abdomen and pelvis 04/18/2019.    FINDINGS:  Examination is overall mildly degraded related to motion artifact.    Inferior Thorax: Normal.    Liver: No focal lesions.    Gallbladder: No gallstones.    Bile Ducts: No dilatation.    Pancreas: No mass. No peripancreatic fat stranding.    Spleen: Normal.    Adrenals: Normal.    Kidneys/Ureters: Normal enhancement.  No mass or hydroureteronephrosis.    Bladder: There is irregular asymmetric right-sided bladder wall thickening, noting that the right superolateral margin of the bladder abuts the known fluid and air collection in the right lower quadrant.  Fluid and air collection is further described below with GI tract.    GI Tract/Mesentery: Stomach is distended with fluid and air.  Remainder of bowel loops are relatively nondistended.  Left lower quadrant ileostomy is present.  Little to no remaining colon is identifiable, though surgical history EMR does not provide details of prior abdominal surgery.  There is apparent blind ending rectal pouch extending up to the region of the right hemipelvis and terminating just below the previously described right lower quadrant fluid  collection.  The fluid and air collection in the right lower quadrant has a somewhat thickened surrounding enhancing wall apposing multiple adjacent loops of small bowel along its anterior/superior margin and the blind-ending rectal pouch along its inferior margin.  This collection, including the thickened enhancing rim measures approximately 5.3 x 3.0 x 3.2 cm.    Reproductive organs: Ovaries are not clearly delineated.  Uterus appears normal to slightly diminutive and is situated along the inferior margin of the aforementioned rim enhancing fluid and air collection.    Peritoneal Space: No ascites or free air.    Retroperitoneum: No pathologically enlarged nodes.    Abdominal wall: Small right lower quadrant spigelian type hernia is present containing a loop of bowel without obstruction.  Left lower quadrant ileostomy is present with associated parastomal hernia.    Vasculature: No aneurysm.    Bones: Normal marrow signal.  Bilateral sacral nerve root sleeve cysts are present.   Impression       Overall mildly degraded examination related to motion artifact.    Unchanged size and appearance of likely abscess in the right lower quadrant, apposing multiple adjacent loops of small bowel along its anterior/superior margin, and the blind-ending rectal pouch, the uterus, and the right superolateral margin of the bladder along its inferior margin.  If further characterization or assessment for 6 to this tract is required, dedicated MR enterography could be performed.    Apparent postsurgical findings of total colectomy with blind-ending rectal pouch and left lower quadrant ileostomy.  Parastomal hernia is present.    Asymmetric right-sided bladder wall thickening may be reactive related to nearby abscess.    Small right lower quadrant spigelian type hernia containing loop of bowel without obstruction.      Electronically signed by: Tee Langford MD  Date: 04/21/2019  Time: 11:17    Encounter     View Encounter              Signed by     Signed Credentials Date/Time  Phone Pager   SATISH LANGFORD MD 4/21/2019 11:17 [phone]   Exam Details     Performed Procedure Technologist Supporting Staff Performing Physician   MRI Abdomen-Pelvis w w/o Contrast (XPD) Jacqueline Jenkins, RT        Appointment Date/Status Modality Department    4/20/2019     Arrived [facility] MRI1 350 LB LIMIT [facility] MRI       Begin Exam End Exam Begin Exam Questionnaires End Exam Questionnaires   4/20/2019 11:15 PM 4/20/2019 11:59 PM MRI TECH NAVIGATOR QUESTIONS IMAGING END ALL     RIS PREGNANCY TECH NAVIGATOR       Reason for Exam   Priority: STAT   Sepsis   Order Report     Order Details   MRI Abdomen-Pelvis w w/o Contrast (XPD)   Status: Final result   [employer] Results Release     [employer] Status: Code Exp  Results Release   PACS Images for [employer] Viewer      Show images for MRI Abdomen-Pelvis w w/o Contrast (XPD)   PACS Images for ViTAL [place] Viewer (Includes [facility] Images)      Show images for MRI Abdomen-Pelvis w w/o Contrast (XPD)   MRI Abdomen-Pelvis w w/o Contrast (XPD)   Order: [id number]   Status:  Final result   Visible to patient:  No (Not Released) Next appt:  None   Details     Reading Physician Reading Date Result Priority   Satish Langford MD 4/21/2019       Narrative     EXAMINATION:  MRI ABDOMEN-PELVIS W W/O CONTRAST (XPD)    CLINICAL HISTORY:  Sepsis;   history of Crohn's disease and question of intra-abdominal abscess.    TECHNIQUE:  Multiplanar multisequence images of the abdomen and pelvis before and after administration of 10 mL Gadavist intravenous contrast.    COMPARISON:  Pelvic ultrasound 04/18/2019.  CT of the abdomen and pelvis 04/18/2019.    FINDINGS:  Examination is overall mildly degraded related to motion artifact.    Inferior Thorax: Normal.    Liver: No focal lesions.    Gallbladder: No gallstones.    Bile Ducts: No dilatation.    Pancreas: No mass. No peripancreatic fat stranding.    Spleen:  Normal.    Adrenals: Normal.    Kidneys/Ureters: Normal enhancement.  No mass or hydroureteronephrosis.    Bladder: There is irregular asymmetric right-sided bladder wall thickening, noting that the right superolateral margin of the bladder abuts the known fluid and air collection in the right lower quadrant.  Fluid and air collection is further described below with GI tract.    GI Tract/Mesentery: Stomach is distended with fluid and air.  Remainder of bowel loops are relatively nondistended.  Left lower quadrant ileostomy is present.  Little to no remaining colon is identifiable, though surgical history EMR does not provide details of prior abdominal surgery.  There is apparent blind ending rectal pouch extending up to the region of the right hemipelvis and terminating just below the previously described right lower quadrant fluid collection.  The fluid and air collection in the right lower quadrant has a somewhat thickened surrounding enhancing wall apposing multiple adjacent loops of small bowel along its anterior/superior margin and the blind-ending rectal pouch along its inferior margin.  This collection, including the thickened enhancing rim measures approximately 5.3 x 3.0 x 3.2 cm.    Reproductive organs: Ovaries are not clearly delineated.  Uterus appears normal to slightly diminutive and is situated along the inferior margin of the aforementioned rim enhancing fluid and air collection.    Peritoneal Space: No ascites or free air.    Retroperitoneum: No pathologically enlarged nodes.    Abdominal wall: Small right lower quadrant spigelian type hernia is present containing a loop of bowel without obstruction.  Left lower quadrant ileostomy is present with associated parastomal hernia.    Vasculature: No aneurysm.    Bones: Normal marrow signal.  Bilateral sacral nerve root sleeve cysts are present.      Impression       Overall mildly degraded examination related to motion artifact.    Unchanged size and  appearance of likely abscess in the right lower quadrant, apposing multiple adjacent loops of small bowel along its anterior/superior margin, and the blind-ending rectal pouch, the uterus, and the right superolateral margin of the bladder along its inferior margin.  If further characterization or assessment for 6 to this tract is required, dedicated MR enterography could be performed.    Apparent postsurgical findings of total colectomy with blind-ending rectal pouch and left lower quadrant ileostomy.  Parastomal hernia is present.    Asymmetric right-sided bladder wall thickening may be reactive related to nearby abscess.    Small right lower quadrant spigelian type hernia containing loop of bowel without obstruction.      Electronically signed by: Tee Langford MD  Date: 04/21/2019  Time: 11:17             Last Resulted: 04/21/19 11:17 Order Details View Encounter Lab and Collection Details Routing Result History            External Result Report     External Result Report       Order Details       Impression:  Crohn's disease, status post total abdominal colectomy with ileostomy, rectum and anus intact. Possible abscess or fluid collection adjacent to the rectal stump.  No immediately compelling surgical issue.    Plan:   IV fluids and antibiotics.  Will discuss with invasive radiology the possibility of percutaneous drainage. Will continue to follow.    Thank you for the opportunity of seeing Rani Carvajal in consultation

## 2019-04-21 NOTE — ED TRIAGE NOTES
"Patient reports that she was unable to be admitted on the day she was seen secondary to having to go [place] to [place]. "I could not come back yesterday because I slept."   "

## 2019-04-21 NOTE — CONSULTS
Gastroenterology Consult    4/21/2019 10:50 AM    Patient Name: Rani Carvajal  MRN: [id number]  Admission Date: 4/20/2019  Hospital Length of Stay: 1 days  Code Status: Full Code   Primary Care Physician: Primary Doctor No  Principal Problem:Sepsis  Consulting Physician: Inpatient consult to Gastroenterology  Consult performed by: Renetta Stein MD  Consult ordered by: Shanita Chen PA-C      Reason for consultation: Crohn's    HPI:  Rani Carvajal is a 48 y.o. female with a history of arthritis, fibromyalgia and Crohn's diseaes requiring ileostomy 20+ years ago who presented with abdominal pain for the last week.  She was seen on 4/18 and imaging showed a possible abscess, but she wasn't admitted to the hospital because she had to take care of her cat.  Pain continues to get worse and became severe, so she returned to the hospital.  She also has had fatigue, fevers and chills over the last week.  She has had Crohn's for over 20 years, she thinks involving both her small and large intestine.  She reports 30+ abdominal surgeries due to fistulas, perforations, abscesses, and hernias.  She says her doctors in [place] had planned a temporary ileostomy when they first performed the surgery, but things never healed, so it was never reversed.  She says she still has her colon (but CT reports changes of a colectomy...) and intermittently passes a mucus discharge from her rectum.  She is not sure if she's ever had a perianal fistula or abscess.  She has had no blood in her ileostomy bag and she denies any changes in the stool output recently.  She denies a history of recurrent UTIs and currently denies dysuria, frequency, urgency or hematuria.  She has not been on any medications for her Crohn's recently. She didn't tolerate Pentasa well (can't recall why) and did okay with remicade.  She has an allergy to imuran (pancreatitis).  She has never been on Humira or the other newer IBD drugs.  She has been in [place] for the last 4 years after moving from [place].  She does not have a gastroenterologist here.  She denies oral ulcers, eye symptoms, joint pains, or rashes.    Past Medical History:   Diagnosis Date    Arthritis     Crohn disease     Fibromyalgia        Past Surgical History:   Procedure Laterality Date    ABDOMINAL SURGERY      COLON SURGERY      ILEOSTOMY     See HPI    Social History     Socioeconomic History    Marital status: Single     Spouse name: Not on file    Number of children: Not on file    Years of education: Not on file    Highest education level: Not on file   Occupational History    Not on file   Social Needs    Financial resource strain: Not on file    Food insecurity:     Worry: Not on file     Inability: Not on file    Transportation needs:     Medical: Not on file     Non-medical: Not on file   Tobacco Use    Smoking status: Current Every Day Smoker     Packs/day: 0.25     Types: Cigarettes    Smokeless tobacco: Current User   Substance and Sexual Activity    Alcohol use: Never     Frequency: Never    Drug use: Never    Sexual activity: Not on file   Lifestyle    Physical activity:     Days per week: Not on file     Minutes per session: Not on file    Stress: Not on file   Relationships    Social connections:     Talks on phone: Not on file     Gets together: Not on file     Attends [employer] service: Not on file     Active member of club or organization: Not on file     Attends meetings of clubs or organizations: Not on file     Relationship status: Not on file   Other Topics Concern    Not on file   Social History Narrative    Not on file       History reviewed. No pertinent family history.      Review of patient's allergies indicates:   Allergen Reactions    Imuran [azathioprine sodium] Other (See Comments)     pancreatitis    Penicillins Hives    Sulfa (sulfonamide antibiotics) Nausea Only and Rash         Current Facility-Administered Medications:      0.9%  NaCl infusion, , Intravenous, Continuous, Shanita Chen PA-C, Last Rate: 125 mL/hr at 04/21/19 0303    acetaminophen tablet 650 mg, 650 mg, Oral, Q4H PRN, Shanita Chen PA-C    cefTRIAXone (ROCEPHIN) 1 g in dextrose 5 % 50 mL IVPB, 1 g, Intravenous, Q24H, Shanita Chen PA-C    HYDROmorphone injection 0.5 mg, 0.5 mg, Intravenous, Q6H PRN, Shanita Chen PA-C    metronidazole IVPB 500 mg, 500 mg, Intravenous, Q8H, Shanita Chen PA-C, Last Rate: 100 mL/hr at 04/21/19 0808, 500 mg at 04/21/19 0808    ondansetron injection 4 mg, 4 mg, Intravenous, Q8H PRN, Shanita Chen PA-C    pantoprazole injection 40 mg, 40 mg, Intravenous, Daily, Shanita Chen PA-C, 40 mg at 04/21/19 0805    promethazine (PHENERGAN) 25 mg in dextrose 5 % 50 mL IVPB, 25 mg, Intravenous, Q6H PRN, Shanita Chen PA-C    sodium chloride 0.9% flush 10 mL, 10 mL, Intravenous, PRN, Shanita Chen PA-C    [START ON 4/22/2019] vancomycin in dextrose 5 % 1 gram/250 mL IVPB 1,000 mg, 1,000 mg, Intravenous, Daily, Shanita Chen PA-C    Review of Systems   Constitutional: Positive for chills, fever and malaise/fatigue. Negative for weight loss.   HENT: Negative.    Eyes: Negative.    Respiratory: Negative.    Cardiovascular: Negative.    Gastrointestinal: Positive for abdominal pain. Negative for blood in stool, constipation, diarrhea, heartburn, melena, nausea and vomiting.   Genitourinary: Negative.    Musculoskeletal: Negative.    Skin: Negative.    Neurological: Negative.    Endo/Heme/Allergies: Negative.        BP (!) 87/53 (Patient Position: Lying)   Pulse (!) 56   Temp 97.9 °F (36.6 °C) (Oral)   Resp 14   Ht 5' (1.524 m)   Wt 47.2 kg (104 lb 0.9 oz)   LMP  (LMP Unknown)   SpO2 97%   Breastfeeding? No   BMI 20.32 kg/m²   Physical Exam   Constitutional: She is oriented to person, place, and time. She appears well-developed and well-nourished. No distress.   HENT:    Head: Normocephalic and atraumatic.   Mouth/Throat: Oropharynx is clear and moist.   Eyes: Pupils are equal, round, and reactive to light. EOM are normal. No scleral icterus.   Cardiovascular: Normal rate and regular rhythm.   No murmur heard.  Pulmonary/Chest: Effort normal and breath sounds normal. She has no wheezes.   Abdominal: Soft. Bowel sounds are normal. She exhibits no distension. There is tenderness (mild suprapubic pain). There is no guarding.   Multiple surgical scars; ileostomy in LLQ   Musculoskeletal: Normal range of motion. She exhibits no edema.   Neurological: She is alert and oriented to person, place, and time. No sensory deficit.   Skin: Skin is warm and dry. No rash noted.   Vitals reviewed.      Labs:  Lab Results   Component Value Date/Time    WBC 8.21 04/21/2019 05:41 AM    WBC 8.21 04/21/2019 05:41 AM    HGB 10.9 (L) 04/21/2019 05:41 AM    HGB 10.9 (L) 04/21/2019 05:41 AM    HCT 32.8 (L) 04/21/2019 05:41 AM    HCT 32.8 (L) 04/21/2019 05:41 AM     04/21/2019 05:41 AM     04/21/2019 05:41 AM    MCV 98 04/21/2019 05:41 AM    MCV 98 04/21/2019 05:41 AM     04/21/2019 05:41 AM     04/21/2019 05:41 AM    K 3.3 (L) 04/21/2019 05:41 AM    K 3.3 (L) 04/21/2019 05:41 AM     04/21/2019 05:41 AM     04/21/2019 05:41 AM    CO2 23 04/21/2019 05:41 AM    CO2 23 04/21/2019 05:41 AM    BUN 4 (L) 04/21/2019 05:41 AM    BUN 4 (L) 04/21/2019 05:41 AM    CREATININE 0.7 04/21/2019 05:41 AM    CREATININE 0.7 04/21/2019 05:41 AM    GLU 88 04/21/2019 05:41 AM    GLU 88 04/21/2019 05:41 AM    CALCIUM 8.1 (L) 04/21/2019 05:41 AM    CALCIUM 8.1 (L) 04/21/2019 05:41 AM    MG 2.6 04/20/2019 08:34 PM   ]  Lab Results   Component Value Date/Time    PROT 5.8 (L) 04/21/2019 05:41 AM    ALBUMIN 2.4 (L) 04/21/2019 05:41 AM    BILITOT 0.2 04/21/2019 05:41 AM    AST 11 04/21/2019 05:41 AM    ALT 6 (L) 04/21/2019 05:41 AM    ALKPHOS 60 04/21/2019 05:41 AM   ]    Imaging and Procedures:  I  personally reviewed the imaging/procedures below.  CT A/P 4/18/19:  Fall focal fluid collection in the right paramedian pelvis with a few tiny foci of nondependent air.  Findings are concerning for abscess.  However, a complex cyst was seen at this site on previous examination.  Adjacent inflammatory changes involving the abutting small bowel and urinary bladder, which may be reactive.  Consider pelvic ultrasound if warranted.  Correlate with urinalysis.  19 x 15 mm cystic appearing area in the left adnexa.  Question remaining left ovary status post hysterectomy rather than an abscess.  Infiltration in the subcutaneous fat of the left medial buttock at the gluteal cleft.  Findings may be inflammatory or infectious.    Pelvic U/S 4/18/19:  Limited by no transvaginal approach.  Complex right adnexal lesion without visualized separate ovary.  Differential considerations may include complex appearance of a hemorrhagic ovarian cyst, tubo-ovarian abscess, cystic neoplasia, abscess related to the bowel, or etiology.  Sonography is inconclusive.  Consider MR when clinically appropriate.    MRI Abdomen 4/20/19:  Read pending    Assessment:  Rani Carvajal is a 48 y.o. female with a history of arthritis, fibromyalgia and Crohn's disease s/p colectomy with end ileostomy who presented with abdominal pain and malaise which has progressively gotten worse over the last week.  Shew as found to have a pelvic abscess.      Plan:  - continue broad spectrum IV antibiotics  - follow up results of MRI  - agree with surgery evaluation  - if there is a large/complex abscess, may need surgical or percutaneous drainage in addition to antibiotics  - in the long run, will likely need to be put back on a biologic to help manage fistulizing disease; will check viral hepatitis panel and quantiferon gold in anticipation of biologic therapy  - may consider ileoscopy through her stoma and flex sig to evaluate any remaining rectum/colon later this  week to assess mucosa for disease activity/look for evidence of a fistula opening prior to starting biologic therapy  - counseled to quit smoking to help with her Crohn's    Renetta Guider, MD

## 2019-04-21 NOTE — PLAN OF CARE
Problem: Adult Inpatient Plan of Care  Goal: Plan of Care Review  Outcome: Ongoing (interventions implemented as appropriate)  Plan of care reviewed with patient.  BP stable after bolus.  Pain pain partially managed with prn medication.  Patient ambulates independently, no falls or injury.  Purposeful rounding completed, call bell within reach.  No needs at this time, will continue to monitor.

## 2019-04-21 NOTE — SUBJECTIVE & OBJECTIVE
Past Medical History:   Diagnosis Date    Arthritis     Crohn disease     Fibromyalgia        Past Surgical History:   Procedure Laterality Date    ABDOMINAL SURGERY      COLON SURGERY      ILEOSTOMY         Review of patient's allergies indicates:   Allergen Reactions    Imuran [azathioprine sodium] Other (See Comments)     pancreatitis    Penicillins Hives    Sulfa (sulfonamide antibiotics) Nausea Only and Rash       No current facility-administered medications on file prior to encounter.      Current Outpatient Medications on File Prior to Encounter   Medication Sig    duloxetine (CYMBALTA) 60 MG capsule Take 60 mg by mouth once daily.    nystatin (MYCOSTATIN) cream Apply topically 2 (two) times daily.    ondansetron (ZOFRAN-ODT) 8 MG TbDL Take 1 tablet (8 mg total) by mouth every 8 (eight) hours as needed.    oxycodone (ROXICODONE) 30 MG Tab Take 5 mg by mouth every 6 (six) hours as needed.    oxycodone 20 mg/ml oral conc (ROXICODONE INTENSOL) 20 mg/mL concentrated solution Take 10 mg by mouth every 4 (four) hours as needed for Pain.    pantoprazole (PROTONIX) 40 MG tablet Take 40 mg by mouth once daily.    quetiapine (SEROQUEL) 200 MG Tab Take by mouth.    tolterodine (DETROL LA) 4 MG 24 hr capsule Take 4 mg by mouth once daily.     Family History     None        Tobacco Use    Smoking status: Current Every Day Smoker     Packs/day: 0.25     Types: Cigarettes    Smokeless tobacco: Current User   Substance and Sexual Activity    Alcohol use: Never     Frequency: Never    Drug use: Never    Sexual activity: Not on file     Review of Systems   Constitutional: Positive for chills, diaphoresis and fever. Negative for appetite change and fatigue.   Respiratory: Negative for cough, shortness of breath and wheezing.    Cardiovascular: Negative for chest pain and palpitations.   Gastrointestinal: Positive for abdominal pain, nausea and vomiting. Negative for abdominal distention, blood in stool,  constipation and diarrhea.   Genitourinary: Negative for dysuria, flank pain, frequency, hematuria, urgency and vaginal discharge.   Musculoskeletal: Positive for myalgias. Negative for arthralgias, back pain, gait problem, joint swelling, neck pain and neck stiffness.   Skin: Negative for color change and pallor.   Neurological: Positive for headaches. Negative for dizziness and light-headedness.   Psychiatric/Behavioral: Negative for confusion and decreased concentration.     Objective:     Vital Signs (Most Recent):  Temp: 98.2 °F (36.8 °C) (04/21/19 0026)  Pulse: (!) 54 (04/21/19 0400)  Resp: 16 (04/21/19 0026)  BP: (!) 95/54 (04/21/19 0026)  SpO2: 97 % (04/21/19 0026) Vital Signs (24h Range):  Temp:  [98.2 °F (36.8 °C)-99.5 °F (37.5 °C)] 98.2 °F (36.8 °C)  Pulse:  [] 54  Resp:  [12-20] 16  SpO2:  [96 %-99 %] 97 %  BP: ()/(54-79) 95/54     Weight: 47.2 kg (104 lb 0.9 oz)  Body mass index is 20.32 kg/m².    Physical Exam   Constitutional: She is oriented to person, place, and time. She appears well-developed and well-nourished. No distress.   HENT:   Head: Normocephalic and atraumatic.   Eyes: Pupils are equal, round, and reactive to light. Conjunctivae and EOM are normal. No scleral icterus.   Neck: Normal range of motion. Neck supple. No tracheal deviation present.   Cardiovascular: Normal rate, regular rhythm, normal heart sounds and intact distal pulses. Exam reveals no gallop and no friction rub.   No murmur heard.  Pulmonary/Chest: Effort normal and breath sounds normal. No stridor. No respiratory distress. She has no wheezes. She has no rales.   Abdominal: Soft. Bowel sounds are normal. She exhibits no distension and no mass. There is tenderness (periumbilical). There is no rebound and no guarding. No hernia.   Ileostomy bag in place   Neurological: She is alert and oriented to person, place, and time. No cranial nerve deficit. She exhibits normal muscle tone.   Skin: Skin is warm and dry. She  is not diaphoretic. No pallor.   Psychiatric: She has a normal mood and affect. Her behavior is normal. Judgment and thought content normal.   Nursing note and vitals reviewed.        CRANIAL NERVES     CN III, IV, VI   Pupils are equal, round, and reactive to light.  Extraocular motions are normal.        Significant Labs:   BMP:   Recent Labs   Lab 04/20/19 2034   *      K 3.5      CO2 23   BUN 6   CREATININE 0.9   CALCIUM 9.7   MG 2.6     CBC:   Recent Labs   Lab 04/20/19 2034 04/21/19  0541   WBC 18.91* 8.21  8.21   HGB 13.6 10.9*  10.9*   HCT 41.7 32.8*  32.8*   * 283  283     CMP:   Recent Labs   Lab 04/20/19 2034      K 3.5      CO2 23   *   BUN 6   CREATININE 0.9   CALCIUM 9.7   PROT 8.4   ALBUMIN 3.2*   BILITOT 0.3   ALKPHOS 79   AST 15   ALT 8*   ANIONGAP 15   EGFRNONAA >60     Urine Culture: No results for input(s): LABURIN in the last 48 hours.  Urine Studies:   Recent Labs   Lab 04/21/19  0202   COLORU Yellow   APPEARANCEUA Clear   PHUR 6.0   SPECGRAV 1.020   PROTEINUA 1+*   GLUCUA Negative   KETONESU Negative   BILIRUBINUA Negative   OCCULTUA 1+*   NITRITE Positive*   UROBILINOGEN Negative   LEUKOCYTESUR 2+*   RBCUA 10*   WBCUA 75*   BACTERIA Many*   HYALINECASTS 2*     All pertinent labs within the past 24 hours have been reviewed.    Significant Imaging: I have reviewed all pertinent imaging results/findings within the past 24 hours.   Imaging Results          MRI Abdomen-Pelvis w w/o Contrast (XPD) (In process)                X-Ray Chest AP Portable (Final result)  Result time 04/20/19 21:33:05    Final result by Taylor Moe MD (04/20/19 21:33:05)                 Impression:      No failure or pneumonia or nodule.      Electronically signed by: Taylor Moe  Date:    04/20/2019  Time:    21:33             Narrative:    EXAMINATION:  XR CHEST AP PORTABLE    CLINICAL HISTORY:  Sepsis;    TECHNIQUE:  Single frontal view of the chest was  performed.    COMPARISON:  None    FINDINGS:  Single portable view presented.  Patient is rotated towards the left.  There is probably a left subclavian catheter terminating at the inferior aspect of the SVC.  No pneumothorax or pleural effusion or interstitial edema consolidation or nodular cavitary lesion.  The heart is probably normal size for technique.  There is mild gas filling of the stomach.  No abdominal free air.  No fracture.

## 2019-04-21 NOTE — PLAN OF CARE
Met with patient at bedside to complete discharge planning assessment. Patient is current with Dr Shanita Correia from [facility] (patient has seen her for 20 years & not interested in switching to a local MD.) Pharmacy of choice is [facility] on [address]. Patient will take a cab home. Patient denied any anticipated DC needs      04/21/19 1320   Discharge Assessment   Assessment Type Discharge Planning Assessment   Confirmed/corrected address and phone number on facesheet? Yes   Assessment information obtained from? Patient   Communicated expected length of stay with patient/caregiver no   Prior to hospitilization cognitive status: Alert/Oriented   Prior to hospitalization functional status: Independent   Current cognitive status: Alert/Oriented   Current Functional Status: Independent   Lives With alone   Able to Return to Prior Arrangements yes   Is patient able to care for self after discharge? Yes   Patient's perception of discharge disposition home or selfcare   Readmission Within the Last 30 Days no previous admission in last 30 days   Patient currently being followed by outpatient case management? No   Patient currently receives any other outside agency services? No   Equipment Currently Used at Home none   Do you have any problems affording any of your prescribed medications? No   Is the patient taking medications as prescribed? yes   Does the patient have transportation home? Yes   Transportation Anticipated public transportation   Does the patient receive services at the [facility]? No   Discharge Plan A Home   DME Needed Upon Discharge  none   Patient/Family in Agreement with Plan yes

## 2019-04-21 NOTE — PROGRESS NOTES
Pharmacokinetic Assessment Follow Up: IV Vancomycin    Vancomycin serum concentration assessment(s):    Levels were re-ordered.   Vancomycin Random for 0130 on 4/23 for toxicity.  Vancomycin Trough for 0130 on 4/24 for steady state.    Pharmacy will continue to follow and monitor vancomycin.    Please contact pharmacy at extension [phone] for questions regarding this assessment.    Thank you for the consult,   Francesca Rivera     Patient brief summary:  Rani Carvajal is a 48 y.o. female initiated on antimicrobial therapy with IV Vancomycin for treatment of suspected sepsis    The patient received a loading dose, followed by the current treatment regimen: vancomycin 1000 mg mg IV every 24 hours    Drug Allergies:   Review of patient's allergies indicates:   Allergen Reactions    Imuran [azathioprine sodium] Other (See Comments)     pancreatitis    Penicillins Hives    Sulfa (sulfonamide antibiotics) Nausea Only and Rash       Actual Body Weight:   47.2 kg    Renal Function:   Estimated Creatinine Clearance: 72.1 mL/min (based on SCr of 0.7 mg/dL).,     Dialysis Method (if applicable):  n/a    CBC (last 72 hours):  Recent Labs   Lab Result Units 04/18/19 1612 04/20/19 2034 04/21/19  0541   WBC K/uL 13.07* 18.91* 8.21  8.21   Hemoglobin g/dL 11.5* 13.6 10.9*  10.9*   Hematocrit % 34.7* 41.7 32.8*  32.8*   Platelets K/uL 271 383* 283  283   Gran% % 86.5* 74.0* 70.1  70.1   Lymph% % 5.0* 14.0* 18.6  18.6   Mono% % 7.7 4.0 9.5  9.5   Eosinophil% % 0.2 0.0 1.2  1.2   Basophil% % 0.2 0.0 0.4  0.4   Differential Method  Automated Manual Automated  Automated       Metabolic Panel (last 72 hours):  Recent Labs   Lab Result Units 04/18/19 1612 04/20/19 2034 04/21/19 0202 04/21/19  0541   Sodium mmol/L 133* 140  --  138  138   Potassium mmol/L 3.4* 3.5  --  3.3*  3.3*   Chloride mmol/L 100 102  --  106  106   CO2 mmol/L 23 23  --  23  23   Glucose mg/dL 143* 114*  --  88  88   Glucose, UA   --   --   Negative  --    BUN, Bld mg/dL 12 6  --  4*  4*   Creatinine mg/dL 0.9 0.9  --  0.7  0.7   Albumin g/dL 2.6* 3.2*  --  2.4*   Total Bilirubin mg/dL 0.2 0.3  --  0.2   Alkaline Phosphatase U/L 76 79  --  60   AST U/L 8* 15  --  11   ALT U/L 5* 8*  --  6*   Magnesium mg/dL  --  2.6  --   --        Vancomycin Administrations:  vancomycin given in the last 96 hours                   vancomycin (VANCOCIN) 1,250 mg in dextrose 5 % 250 mL IVPB (mg) 1,250 mg New Bag 04/21/19 0154                Drug levels (last 3 results):  No results for input(s): VANCOMYCINRA, VANCOMYCINPE, VANCOMYCINTR, VANCOTROUGH in the last 72 hours.    Microbiologic Results:  Microbiology Results (last 7 days)     Procedure Component Value Units Date/Time    Blood culture x two cultures. Draw prior to antibiotics. [[id number]] Collected:  04/20/19 2032    Order Status:  Sent Specimen:  Blood from Peripheral, Antecubital, Left Updated:  04/21/19 0842    Blood culture x two cultures. Draw prior to antibiotics. [[id number]] Collected:  04/20/19 2102    Order Status:  Sent Specimen:  Blood from Peripheral, Antecubital, Right Updated:  04/21/19 0842    Urine culture [[id number]] Collected:  04/21/19 0202    Order Status:  No result Specimen:  Urine Updated:  04/21/19 0228

## 2019-04-21 NOTE — H&P
"[facility] Medicine  History & Physical    Patient Name: Rani Carvajal  MRN: [id number]  Admission Date: 4/20/2019  Attending Physician: RALEIGH Cordon MD   Primary Care Provider: Primary Doctor No         Patient information was obtained from patient, past medical records and ER records.     Subjective:     Principal Problem:Sepsis    Chief Complaint:   Chief Complaint   Patient presents with    returned for admission     reports being seen 2 days and diagnosed with possible abscess to abd and UTI.        HPI: Ms. Rani Carvajal is a 48 y.o. female, who per ED note presented with "complaint of abdominal pain that began approximately one week ago. She reports that she is also experiencing subjective fever, nausea, vomiting, and an intermittent headache. The patient was seen on 4/18/19 for similar symptoms and reports that they diagnosed her with a "possible abdominal abscess". She reports that she was unable to stay that day because she had to take care of her cat. She reports that the pain has been worsening since onset.  Pain is severe in degree.  Associated with malaise (patient slept all day yesterday), generalized weakness.  She denies vaginal bleeding, dysuria." She does have an ileostomy bag, which has had normal stool output. She denies dysuria or UTI symptoms. She also notes subjective fevers, associated with chills, and sweats. ED workup showed possible abscess vs. Cyst in pelvis on CT. US to follow up was unrevealing. MRI pending. She was admitted to inpatient status.     Past Medical History:   Diagnosis Date    Arthritis     Crohn disease     Fibromyalgia        Past Surgical History:   Procedure Laterality Date    ABDOMINAL SURGERY      COLON SURGERY      ILEOSTOMY         Review of patient's allergies indicates:   Allergen Reactions    Imuran [azathioprine sodium] Other (See Comments)     pancreatitis    Penicillins Hives    Sulfa (sulfonamide " antibiotics) Nausea Only and Rash       No current facility-administered medications on file prior to encounter.      Current Outpatient Medications on File Prior to Encounter   Medication Sig    duloxetine (CYMBALTA) 60 MG capsule Take 60 mg by mouth once daily.    nystatin (MYCOSTATIN) cream Apply topically 2 (two) times daily.    ondansetron (ZOFRAN-ODT) 8 MG TbDL Take 1 tablet (8 mg total) by mouth every 8 (eight) hours as needed.    oxycodone (ROXICODONE) 30 MG Tab Take 5 mg by mouth every 6 (six) hours as needed.    oxycodone 20 mg/ml oral conc (ROXICODONE INTENSOL) 20 mg/mL concentrated solution Take 10 mg by mouth every 4 (four) hours as needed for Pain.    pantoprazole (PROTONIX) 40 MG tablet Take 40 mg by mouth once daily.    quetiapine (SEROQUEL) 200 MG Tab Take by mouth.    tolterodine (DETROL LA) 4 MG 24 hr capsule Take 4 mg by mouth once daily.     Family History     None        Tobacco Use    Smoking status: Current Every Day Smoker     Packs/day: 0.25     Types: Cigarettes    Smokeless tobacco: Current User   Substance and Sexual Activity    Alcohol use: Never     Frequency: Never    Drug use: Never    Sexual activity: Not on file     Review of Systems   Constitutional: Positive for chills, diaphoresis and fever. Negative for appetite change and fatigue.   Respiratory: Negative for cough, shortness of breath and wheezing.    Cardiovascular: Negative for chest pain and palpitations.   Gastrointestinal: Positive for abdominal pain, nausea and vomiting. Negative for abdominal distention, blood in stool, constipation and diarrhea.   Genitourinary: Negative for dysuria, flank pain, frequency, hematuria, urgency and vaginal discharge.   Musculoskeletal: Positive for myalgias. Negative for arthralgias, back pain, gait problem, joint swelling, neck pain and neck stiffness.   Skin: Negative for color change and pallor.   Neurological: Positive for headaches. Negative for dizziness and  light-headedness.   Psychiatric/Behavioral: Negative for confusion and decreased concentration.     Objective:     Vital Signs (Most Recent):  Temp: 98.2 °F (36.8 °C) (04/21/19 0026)  Pulse: (!) 54 (04/21/19 0400)  Resp: 16 (04/21/19 0026)  BP: (!) 95/54 (04/21/19 0026)  SpO2: 97 % (04/21/19 0026) Vital Signs (24h Range):  Temp:  [98.2 °F (36.8 °C)-99.5 °F (37.5 °C)] 98.2 °F (36.8 °C)  Pulse:  [] 54  Resp:  [12-20] 16  SpO2:  [96 %-99 %] 97 %  BP: ()/(54-79) 95/54     Weight: 47.2 kg (104 lb 0.9 oz)  Body mass index is 20.32 kg/m².    Physical Exam   Constitutional: She is oriented to person, place, and time. She appears well-developed and well-nourished. No distress.   HENT:   Head: Normocephalic and atraumatic.   Eyes: Pupils are equal, round, and reactive to light. Conjunctivae and EOM are normal. No scleral icterus.   Neck: Normal range of motion. Neck supple. No tracheal deviation present.   Cardiovascular: Normal rate, regular rhythm, normal heart sounds and intact distal pulses. Exam reveals no gallop and no friction rub.   No murmur heard.  Pulmonary/Chest: Effort normal and breath sounds normal. No stridor. No respiratory distress. She has no wheezes. She has no rales.   Abdominal: Soft. Bowel sounds are normal. She exhibits no distension and no mass. There is tenderness (periumbilical). There is no rebound and no guarding. No hernia.   Ileostomy bag in place   Neurological: She is alert and oriented to person, place, and time. No cranial nerve deficit. She exhibits normal muscle tone.   Skin: Skin is warm and dry. She is not diaphoretic. No pallor.   Psychiatric: She has a normal mood and affect. Her behavior is normal. Judgment and thought content normal.   Nursing note and vitals reviewed.        CRANIAL NERVES     CN III, IV, VI   Pupils are equal, round, and reactive to light.  Extraocular motions are normal.        Significant Labs:   BMP:   Recent Labs   Lab 04/20/19 2034   *   NA  140   K 3.5      CO2 23   BUN 6   CREATININE 0.9   CALCIUM 9.7   MG 2.6     CBC:   Recent Labs   Lab 04/20/19 2034 04/21/19  0541   WBC 18.91* 8.21  8.21   HGB 13.6 10.9*  10.9*   HCT 41.7 32.8*  32.8*   * 283  283     CMP:   Recent Labs   Lab 04/20/19 2034      K 3.5      CO2 23   *   BUN 6   CREATININE 0.9   CALCIUM 9.7   PROT 8.4   ALBUMIN 3.2*   BILITOT 0.3   ALKPHOS 79   AST 15   ALT 8*   ANIONGAP 15   EGFRNONAA >60     Urine Culture: No results for input(s): LABURIN in the last 48 hours.  Urine Studies:   Recent Labs   Lab 04/21/19  0202   COLORU Yellow   APPEARANCEUA Clear   PHUR 6.0   SPECGRAV 1.020   PROTEINUA 1+*   GLUCUA Negative   KETONESU Negative   BILIRUBINUA Negative   OCCULTUA 1+*   NITRITE Positive*   UROBILINOGEN Negative   LEUKOCYTESUR 2+*   RBCUA 10*   WBCUA 75*   BACTERIA Many*   HYALINECASTS 2*     All pertinent labs within the past 24 hours have been reviewed.    Significant Imaging: I have reviewed all pertinent imaging results/findings within the past 24 hours.   Imaging Results          MRI Abdomen-Pelvis w w/o Contrast (XPD) (In process)                X-Ray Chest AP Portable (Final result)  Result time 04/20/19 21:33:05    Final result by Taylor Moe MD (04/20/19 21:33:05)                 Impression:      No failure or pneumonia or nodule.      Electronically signed by: Taylor Moe  Date:    04/20/2019  Time:    21:33             Narrative:    EXAMINATION:  XR CHEST AP PORTABLE    CLINICAL HISTORY:  Sepsis;    TECHNIQUE:  Single frontal view of the chest was performed.    COMPARISON:  None    FINDINGS:  Single portable view presented.  Patient is rotated towards the left.  There is probably a left subclavian catheter terminating at the inferior aspect of the SVC.  No pneumothorax or pleural effusion or interstitial edema consolidation or nodular cavitary lesion.  The heart is probably normal size for technique.  There is mild gas filling of  the stomach.  No abdominal free air.  No fracture.                                 Assessment/Plan:     * Sepsis  - Ms. Rani Carvajal is admitted to inpatient status  - source is Crohn's flare w/ suspected abdominal abscess  - lactic acid       Acute cystitis  - already on rocephin & vancomycin, continue  - UC pending      Crohn's disease of large intestine  - imaging is concerning for associated pelvic abscess vs. Complex Cyst    - follow up US was inconclusive   - follow up MRI pending   - GI consulted, surgery consulted   - continue IV antibiotics   - monitor       Abnormal findings on diagnostic imaging of abdomen  - as above in Crohn's disease     VTE Risk Mitigation (From admission, onward)        Ordered     IP VTE LOW RISK PATIENT  Once      04/21/19 0030     Place GINA hose  Until discontinued      04/21/19 0030     Place sequential compression device  Until discontinued      04/21/19 0030             Shanita Chen PA-C  Department of Hospital Medicine   [facility]

## 2019-04-21 NOTE — ED TRIAGE NOTES
"Pt arrives to ED stating she is returning to hospital to be admitted. Pt was seen 2 days ago for "possible abscess on intestine or ovary." Pt states "I was in the hospital 2 days ago, but I couldn't stay, I needed to go home and take care of my cat."  Pt reports subjective fever, chills, and sweats.  Pt complains of upper "abdominal pain 1 inch above umbilicus." Pt reports taking oxycodone today for pain. Pt lying in stretcher, respirations even and unlabored, eyes open spontaneously.  NAD noted, AAOx4, answering questions appropriately.  Pt placed on BP cycling, pulse ox, and cardiac monitoring q30min.  Bed locked and in lowest position, SRx2 up, call light within reach  "

## 2019-04-22 PROBLEM — R53.81 DEBILITY: Status: ACTIVE | Noted: 2019-04-22

## 2019-04-22 PROBLEM — M79.7 FIBROMYALGIA: Chronic | Status: ACTIVE | Noted: 2019-04-22

## 2019-04-22 LAB
ANION GAP SERPL CALC-SCNC: 5 MMOL/L (ref 8–16)
ANION GAP SERPL CALC-SCNC: 8 MMOL/L (ref 8–16)
BACTERIA UR CULT: NORMAL
BASOPHILS # BLD AUTO: 0.03 K/UL (ref 0–0.2)
BASOPHILS NFR BLD: 0.5 % (ref 0–1.9)
BUN SERPL-MCNC: 2 MG/DL (ref 6–20)
BUN SERPL-MCNC: <2 MG/DL (ref 6–20)
CALCIUM SERPL-MCNC: 7.9 MG/DL (ref 8.7–10.5)
CALCIUM SERPL-MCNC: 8.3 MG/DL (ref 8.7–10.5)
CHLORIDE SERPL-SCNC: 111 MMOL/L (ref 95–110)
CHLORIDE SERPL-SCNC: 112 MMOL/L (ref 95–110)
CO2 SERPL-SCNC: 20 MMOL/L (ref 23–29)
CO2 SERPL-SCNC: 23 MMOL/L (ref 23–29)
CREAT SERPL-MCNC: 0.7 MG/DL (ref 0.5–1.4)
CREAT SERPL-MCNC: 0.8 MG/DL (ref 0.5–1.4)
DIFFERENTIAL METHOD: ABNORMAL
EOSINOPHIL # BLD AUTO: 0.1 K/UL (ref 0–0.5)
EOSINOPHIL NFR BLD: 2 % (ref 0–8)
ERYTHROCYTE [DISTWIDTH] IN BLOOD BY AUTOMATED COUNT: 15.5 % (ref 11.5–14.5)
EST. GFR  (AFRICAN AMERICAN): >60 ML/MIN/1.73 M^2
EST. GFR  (AFRICAN AMERICAN): >60 ML/MIN/1.73 M^2
EST. GFR  (NON AFRICAN AMERICAN): >60 ML/MIN/1.73 M^2
EST. GFR  (NON AFRICAN AMERICAN): >60 ML/MIN/1.73 M^2
GLUCOSE SERPL-MCNC: 117 MG/DL (ref 70–110)
GLUCOSE SERPL-MCNC: 99 MG/DL (ref 70–110)
HAV IGM SERPL QL IA: NEGATIVE
HBV CORE IGM SERPL QL IA: NEGATIVE
HBV SURFACE AB SER-ACNC: NEGATIVE M[IU]/ML
HBV SURFACE AG SERPL QL IA: NEGATIVE
HCT VFR BLD AUTO: 29.9 % (ref 37–48.5)
HCV AB SERPL QL IA: NEGATIVE
HEPATITIS A ANTIBODY, IGG: NEGATIVE
HGB BLD-MCNC: 9.7 G/DL (ref 12–16)
LYMPHOCYTES # BLD AUTO: 1.8 K/UL (ref 1–4.8)
LYMPHOCYTES NFR BLD: 28.2 % (ref 18–48)
MCH RBC QN AUTO: 32 PG (ref 27–31)
MCHC RBC AUTO-ENTMCNC: 32.4 G/DL (ref 32–36)
MCV RBC AUTO: 99 FL (ref 82–98)
MONOCYTES # BLD AUTO: 0.4 K/UL (ref 0.3–1)
MONOCYTES NFR BLD: 6.8 % (ref 4–15)
NEUTROPHILS # BLD AUTO: 4 K/UL (ref 1.8–7.7)
NEUTROPHILS NFR BLD: 62.5 % (ref 38–73)
PLATELET # BLD AUTO: 274 K/UL (ref 150–350)
PMV BLD AUTO: 9.4 FL (ref 9.2–12.9)
POTASSIUM SERPL-SCNC: 2.9 MMOL/L (ref 3.5–5.1)
POTASSIUM SERPL-SCNC: 4 MMOL/L (ref 3.5–5.1)
RBC # BLD AUTO: 3.03 M/UL (ref 4–5.4)
SODIUM SERPL-SCNC: 139 MMOL/L (ref 136–145)
SODIUM SERPL-SCNC: 140 MMOL/L (ref 136–145)
WBC # BLD AUTO: 6.45 K/UL (ref 3.9–12.7)

## 2019-04-22 PROCEDURE — 99233 PR SUBSEQUENT HOSPITAL CARE,LEVL III: ICD-10-PCS | Mod: ,,, | Performed by: INTERNAL MEDICINE

## 2019-04-22 PROCEDURE — 11000001 HC ACUTE MED/SURG PRIVATE ROOM

## 2019-04-22 PROCEDURE — 86706 HEP B SURFACE ANTIBODY: CPT

## 2019-04-22 PROCEDURE — 63600175 PHARM REV CODE 636 W HCPCS: Performed by: PHYSICIAN ASSISTANT

## 2019-04-22 PROCEDURE — 86790 VIRUS ANTIBODY NOS: CPT

## 2019-04-22 PROCEDURE — 86480 TB TEST CELL IMMUN MEASURE: CPT

## 2019-04-22 PROCEDURE — 63600175 PHARM REV CODE 636 W HCPCS: Performed by: INTERNAL MEDICINE

## 2019-04-22 PROCEDURE — 25000003 PHARM REV CODE 250: Performed by: INTERNAL MEDICINE

## 2019-04-22 PROCEDURE — 94761 N-INVAS EAR/PLS OXIMETRY MLT: CPT

## 2019-04-22 PROCEDURE — S0030 INJECTION, METRONIDAZOLE: HCPCS | Performed by: PHYSICIAN ASSISTANT

## 2019-04-22 PROCEDURE — 99233 SBSQ HOSP IP/OBS HIGH 50: CPT | Mod: ,,, | Performed by: INTERNAL MEDICINE

## 2019-04-22 PROCEDURE — 97165 OT EVAL LOW COMPLEX 30 MIN: CPT

## 2019-04-22 PROCEDURE — 80074 ACUTE HEPATITIS PANEL: CPT

## 2019-04-22 PROCEDURE — 80048 BASIC METABOLIC PNL TOTAL CA: CPT

## 2019-04-22 PROCEDURE — C9113 INJ PANTOPRAZOLE SODIUM, VIA: HCPCS | Performed by: PHYSICIAN ASSISTANT

## 2019-04-22 PROCEDURE — 85025 COMPLETE CBC W/AUTO DIFF WBC: CPT

## 2019-04-22 PROCEDURE — 97530 THERAPEUTIC ACTIVITIES: CPT

## 2019-04-22 PROCEDURE — 97162 PT EVAL MOD COMPLEX 30 MIN: CPT

## 2019-04-22 PROCEDURE — 36415 COLL VENOUS BLD VENIPUNCTURE: CPT

## 2019-04-22 PROCEDURE — 80048 BASIC METABOLIC PNL TOTAL CA: CPT | Mod: 91

## 2019-04-22 PROCEDURE — 25000003 PHARM REV CODE 250: Performed by: PHYSICIAN ASSISTANT

## 2019-04-22 RX ORDER — OXYCODONE HYDROCHLORIDE 5 MG/1
5 TABLET ORAL EVERY 6 HOURS PRN
Status: DISCONTINUED | OUTPATIENT
Start: 2019-04-22 | End: 2019-04-22

## 2019-04-22 RX ORDER — POTASSIUM CHLORIDE 20 MEQ/15ML
40 SOLUTION ORAL
Status: COMPLETED | OUTPATIENT
Start: 2019-04-22 | End: 2019-04-22

## 2019-04-22 RX ORDER — FOLIC ACID 1 MG/1
1 TABLET ORAL DAILY
COMMUNITY

## 2019-04-22 RX ORDER — QUETIAPINE FUMARATE 100 MG/1
100 TABLET, FILM COATED ORAL NIGHTLY
Status: DISCONTINUED | OUTPATIENT
Start: 2019-04-22 | End: 2019-04-24 | Stop reason: HOSPADM

## 2019-04-22 RX ORDER — HYDROMORPHONE HYDROCHLORIDE 1 MG/ML
0.5 INJECTION, SOLUTION INTRAMUSCULAR; INTRAVENOUS; SUBCUTANEOUS EVERY 6 HOURS PRN
Status: DISCONTINUED | OUTPATIENT
Start: 2019-04-22 | End: 2019-04-24 | Stop reason: HOSPADM

## 2019-04-22 RX ORDER — DULOXETIN HYDROCHLORIDE 30 MG/1
60 CAPSULE, DELAYED RELEASE ORAL 2 TIMES DAILY
Status: DISCONTINUED | OUTPATIENT
Start: 2019-04-22 | End: 2019-04-24 | Stop reason: HOSPADM

## 2019-04-22 RX ORDER — OXYCODONE HCL 5 MG/5 ML
20 SOLUTION, ORAL ORAL EVERY 6 HOURS PRN
Status: DISCONTINUED | OUTPATIENT
Start: 2019-04-22 | End: 2019-04-24 | Stop reason: HOSPADM

## 2019-04-22 RX ORDER — QUETIAPINE FUMARATE 200 MG/1
200 TABLET, FILM COATED ORAL NIGHTLY
Status: DISCONTINUED | OUTPATIENT
Start: 2019-04-22 | End: 2019-04-22

## 2019-04-22 RX ORDER — DULOXETIN HYDROCHLORIDE 30 MG/1
60 CAPSULE, DELAYED RELEASE ORAL DAILY
Status: DISCONTINUED | OUTPATIENT
Start: 2019-04-22 | End: 2019-04-22

## 2019-04-22 RX ADMIN — METRONIDAZOLE 500 MG: 500 INJECTION, SOLUTION INTRAVENOUS at 01:04

## 2019-04-22 RX ADMIN — HYDROMORPHONE HYDROCHLORIDE 1 MG: 1 INJECTION, SOLUTION INTRAMUSCULAR; INTRAVENOUS; SUBCUTANEOUS at 07:04

## 2019-04-22 RX ADMIN — POTASSIUM CHLORIDE 40 MEQ: 40 SOLUTION ORAL at 08:04

## 2019-04-22 RX ADMIN — SODIUM CHLORIDE: 0.9 INJECTION, SOLUTION INTRAVENOUS at 01:04

## 2019-04-22 RX ADMIN — DULOXETINE 60 MG: 30 CAPSULE, DELAYED RELEASE ORAL at 09:04

## 2019-04-22 RX ADMIN — PANTOPRAZOLE SODIUM 40 MG: 40 INJECTION, POWDER, FOR SOLUTION INTRAVENOUS at 08:04

## 2019-04-22 RX ADMIN — POTASSIUM CHLORIDE 40 MEQ: 40 SOLUTION ORAL at 10:04

## 2019-04-22 RX ADMIN — VANCOMYCIN HYDROCHLORIDE 1000 MG: 1 INJECTION, POWDER, FOR SOLUTION INTRAVENOUS at 03:04

## 2019-04-22 RX ADMIN — HYDROMORPHONE HYDROCHLORIDE 0.5 MG: 1 INJECTION, SOLUTION INTRAMUSCULAR; INTRAVENOUS; SUBCUTANEOUS at 07:04

## 2019-04-22 RX ADMIN — OXYCODONE HYDROCHLORIDE 20 MG: 5 SOLUTION ORAL at 01:04

## 2019-04-22 RX ADMIN — OXYCODONE HYDROCHLORIDE 5 MG: 5 TABLET ORAL at 09:04

## 2019-04-22 RX ADMIN — QUETIAPINE FUMARATE 100 MG: 100 TABLET ORAL at 09:04

## 2019-04-22 RX ADMIN — CEFTRIAXONE 1 G: 1 INJECTION, SOLUTION INTRAVENOUS at 09:04

## 2019-04-22 RX ADMIN — METRONIDAZOLE 500 MG: 500 INJECTION, SOLUTION INTRAVENOUS at 08:04

## 2019-04-22 RX ADMIN — SODIUM CHLORIDE: 0.9 INJECTION, SOLUTION INTRAVENOUS at 10:04

## 2019-04-22 RX ADMIN — HYDROMORPHONE HYDROCHLORIDE 1 MG: 1 INJECTION, SOLUTION INTRAMUSCULAR; INTRAVENOUS; SUBCUTANEOUS at 01:04

## 2019-04-22 RX ADMIN — METRONIDAZOLE 500 MG: 500 INJECTION, SOLUTION INTRAVENOUS at 04:04

## 2019-04-22 RX ADMIN — SODIUM CHLORIDE: 0.9 INJECTION, SOLUTION INTRAVENOUS at 03:04

## 2019-04-22 RX ADMIN — METRONIDAZOLE 500 MG: 500 INJECTION, SOLUTION INTRAVENOUS at 11:04

## 2019-04-22 NOTE — PLAN OF CARE
Problem: Adult Inpatient Plan of Care  Goal: Plan of Care Review  Outcome: Ongoing (interventions implemented as appropriate)  Plan of care reviewed with patient.  Cardiac monitoring maintained.  IV fluids maintained, and IV antibiotics administered as ordered.  Pain partially managed with prn medication.  Patient manages her Ileostomy independently.  Patient tolerating low residual diet.  Purposeful rounding completed, call bell within reach, no needs at this time.  Will continue to monitor.

## 2019-04-22 NOTE — ASSESSMENT & PLAN NOTE
- Sepsis with intra-abdominal abscess as noted on CT, MRI 04/20/19.  - Complicated by underlying Crohn's disease s/p ileostomy.  - Continuing ceftriaxone 1g IV q24hr, metronidazole 500mg IV q8hr, vancomycin 1g IV daily (appreciate pharmacy dosing assistance).  - Improving; advance diet as tolerated.  - Appreciate general surgery, GI assistance; IR consulted for potential drainage but no suitable path for access at this time.  - Continue to monitor; if worsening may need to re-image for potential IR drainage versus ultimately surgical intervention if no other options and antibiotic therapy alone failing.
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
## 2019-04-22 NOTE — ASSESSMENT & PLAN NOTE
- Urine culture 04/18 demonstrated Morganella morganii.  - Will cover concurrently with intra-abdominal coverage as under sepsis.

## 2019-04-22 NOTE — CONSULTS
IR consulted for possible drainage of pelvic collection. CT 4/18/19 and MRI 4/20/19 reviewed. There is not a safe window to access collection deep in the pelvis. There is bowel anteriorly and bowel and iliac vessels laterally. If the collection increases in size or there is a safe window on follow up imaging, could reconsider drainage at that time. Please contact IR with questions.    Edilberto Palacio MD  Department of Radiology  Pager: [phone]

## 2019-04-22 NOTE — PLAN OF CARE
Problem: Occupational Therapy Goal  Goal: Occupational Therapy Goal    OT evaluation complete.  Pt is performing at baseline and does not require OT services in the acute care setting at this time.  Pt to d/c from OT with HHOT recommended upon d/c from hospital.

## 2019-04-22 NOTE — PLAN OF CARE
Problem: Adult Inpatient Plan of Care  Goal: Plan of Care Review  Outcome: Ongoing (interventions implemented as appropriate)  POC reviewed with patient. Purposeful rounding performed, no needs at this time. All safety measures in place. Will continue to monitor.

## 2019-04-22 NOTE — PROGRESS NOTES
[facility]  Adult Nutrition  Progress Note    SUMMARY       Recommendations    Recommendation:  1. Continue low fiber diet   2. Add boost breeze ONS   Goals:  1. po >50% of meals by RD follow up   2. prevent wt loss on admisison   3. nutrition related lab values WNL   Nutrition Goal Status: new  Communication of RD Recs: other (comment)(POC)    Reason for Assessment    Reason For Assessment: identified at risk by screening criteria(MST >3)  Diagnosis: (intra-abdominal abcess, chrons disease)  Relevant Medical History: arthritis; chrons; fibromyalgia   Interdisciplinary Rounds: did not attend  General Information Comments: Remote assessment completed. PO documeted at 100% x 1  meal. Pt presented with abdominal pain, N/V, likely to have decreased intake. Recommend adding ONS if pt accepts and is able to tolerate. Note per chart, ~6 lb wt gain since admission. NFPE to be completed on f/u. Pt to be educated on low fiber diet. RD to follow up.   Nutrition Discharge Planning: d/c on low fiber/residue diet     Nutrition Risk Screen    Nutrition Risk Screen: no indicators present    Nutrition/Diet History    Food Preferences: unable to obtain past food preferences   Spiritual, Cultural Beliefs, Worship Practices, Values that Affect Care: no  Factors Affecting Nutritional Intake: abdominal pain, nausea/vomiting    Anthropometrics    Temp: 98.4 °F (36.9 °C)  Height Method: Stated  Height: 5' (152.4 cm)  Height (inches): 60 in  Weight Method: Bed Scale  Weight: 50.1 kg (110 lb 7.2 oz)  Weight (lb): 110.45 lb  Ideal Body Weight (IBW), Female: 100 lb  % Ideal Body Weight, Female (lb): 104.06 lb  BMI (Calculated): 20.4  BMI Grade: 18.5-24.9 - normal  Usual Body Weight (UBW), k.27 kg(per chart)  % Usual Body Weight: 106.21     Lab/Procedures/Meds    Pertinent Labs Reviewed: reviewed  Pertinent Labs Comments: BUN <2; Ca 8.3  Pertinent Medications Reviewed: reviewed  Pertinent Medications Comments:  ceftriaxone; duloxetine; metronidazole; pantoprazole; potassium chloride; vancomycin     Estimated/Assessed Needs    Weight Used For Calorie Calculations: 50.1 kg (110 lb 7.2 oz)  Energy Calorie Requirements (kcal): 5644-3346 kcal/day (25-30 kcal/kg)  Energy Need Method: Kcal/kg  Protein Requirements: 60-75 g/day (1.2-1.5 g/kg)   Weight Used For Protein Calculations: 50.1 kg (110 lb 7.2 oz)  Fluid Requirements (mL): 1 mL/kcal or per MD   Estimated Fluid Requirement Method: RDA Method  RDA Method (mL): 1252     Nutrition Prescription Ordered    Current Diet Order: Low fiber/residue diet   Oral Nutrition Supplement: add boost breeze BID     Evaluation of Received Nutrient/Fluid Intake    Tolerance: tolerating  % Intake of Estimated Energy Needs: 75 - 100 %  % Meal Intake: 75 - 100 % (recorded x1 meal)    Nutrition Risk    Level of Risk/Frequency of Follow-up: low     Assessment and Plan  Nutrition Problem  Increased nutrient needs    Related to (etiology):   Sepsis and N/V    Signs and Symptoms (as evidenced by):   abdominal abscess and s/p colectomy     Interventions (treatment strategy):  Continue low fiber diet   Add commercial beverage  Collaboration with other providers     Nutrition Diagnosis Status:   New      Monitor and Evaluation    Food and Nutrient Intake: energy intake  Food and Nutrient Adminstration: diet order  Anthropometric Measurements: weight, weight change  Biochemical Data, Medical Tests and Procedures: electrolyte and renal panel, gastrointestinal profile, glucose/endocrine profile, lipid profile, inflammatory profile  Nutrition-Focused Physical Findings: overall appearance     Malnutrition Assessment  NFPE to be completed on f/u        Nutrition Follow-Up    RD Follow-up?: Yes

## 2019-04-22 NOTE — PT/OT/SLP EVAL
Occupational Therapy   Evaluation & Discharge Summary    Name: Rani Carvajal  MRN: [id number]  Admitting Diagnosis:  Sepsis      Recommendations:     Discharge Recommendations: home health OT, outpatient PT(HH recommended to help pt set up home environment to support increased independence with ADLs/IADLs)  Discharge Equipment Recommendations:  shower chair  Barriers to discharge:  Decreased caregiver support(pt lives alone)    Assessment:     Rani Carvajal is a 48 y.o. female with a medical diagnosis of Sepsis.  She presents with severe pain throughout body (pt reports from fibromyalgia). Performance deficits affecting function: pain, impaired endurance.  Pt demonstrates strength and ROM in (B) UE needed for ADLs that is WFL, and is able to wash hands while standing with supervision.  PTA pt reports being (I) with ADLs, but was having trouble standing for long periods of time to perform IADLs 2* pain.  Pt is performing at baseline and does not require OT services in the acute care setting; however, could benefit from home health OT to help manage home environment and develop strategies to perform ADLs/IADLs more effectively.       Rehab Prognosis: Good; patient would benefit from acute skilled OT services to address these deficits and reach maximum level of function.       Plan:     · Patient to d/c from OT    Subjective     Chief Complaint: Pain  Patient/Family Comments/goals: Be able to do daily activities    Occupational Profile:  Living Environment: Pt lives alone with cat in duplex; 3-4 LORENA with wobbly HR present on one side.  Bathroom contains tub/shower combination.  Previous level of function: Pt reports being (I) with ADLs, but feels like things are more challenging lately.  For bathing she did not have the strength to turn the shower knob (from bath to shower) so she takes a cup and fills it with water during task.  She takes half a bath and rests and then completes other half of bath.    Roles and  "Routines: Pt cares for her cat.  She does not drive, but takes public transportation when necessary.  Pt states she hasn't been able to cook recently 2* she can't stand for long periods of time (experiences severe pain).  She states her home is a "disaster area" as she is unable to stand and clean it.  Equipment Used at Home:  none  Assistance upon Discharge: Pt lives alone    Pain/Comfort:  · Pain Rating 1: 9/10  · Location - Orientation 1: generalized  · Location 1: ("everywhere")  · Pain Addressed 1: Nurse notified  · Pain Rating Post-Intervention 1: 9/10    Patients cultural, spiritual, Roman Catholic conflicts given the current situation: no    Objective:     Communicated with: RN prior to session.  Patient found HOB elevated with telemetry, peripheral IV upon OT entry to room.    General Precautions: Standard, fall   Orthopedic Precautions:N/A   Braces: N/A     Occupational Performance:    Bed Mobility:    · Patient completed Rolling/Turning to Left with  independence  · Patient completed Scooting/Bridging with independence  · Patient completed Supine to Sit with independence  · Patient completed Sit to Supine with independence    Functional Mobility/Transfers:  · Patient completed Sit <> Stand Transfer with supervision  with  no assistive device x 2 trials from EOB  · Functional Mobility: Pt ambulated 20 ft within room with supervision.  No instances of LOB noted.    Activities of Daily Living:  · Grooming: supervision for washing and drying hands while standing at sink.    Cognitive/Visual Perceptual:  Cognitive/Psychosocial Skills:    -       Oriented to: Person, Place, Time and Situation   -       Follows Commands/attention:Follows multistep  commands  -       Communication: clear/fluent  -       Memory: No Deficits noted  -       Safety awareness/insight to disability: intact   -       Mood/Affect/Coping skills/emotional control: Appropriate to situation    Physical Exam:  Postural examination/scapula " alignment:    -       No postural abnormalities identified  Skin integrity: Visible skin intact  Edema:  None noted  Sensation:  Pt reports being hypersensitive  Motor Planning: WNL  Dominant hand: right  Upper Extremity Range of Motion: WFL  Upper Extremity Strength: WFL   Strength: 4/5 both hands  Fine Motor Coordination: Intact  Gross motor coordination: WNL  Balance:  Sitting- Independent; Standing- Supervision-independent  Vision: Intact    AMPAC 6 Click ADL:  AMPAC Total Score: 24    Treatment & Education:  *Pt educated on role of OT and POC discussed  Education:    Patient left HOB elevated with all lines intact, call button in reach and RN notified    GOALS:   Multidisciplinary Problems     Occupational Therapy Goals        Problem: Occupational Therapy Goal    Goal Priority Disciplines Outcome Interventions   Occupational Therapy Goal     OT, PT/OT                     History:     Past Medical History:   Diagnosis Date    Arthritis     Crohn disease     Fibromyalgia        Past Surgical History:   Procedure Laterality Date    ABDOMINAL SURGERY      COLON SURGERY      ILEOSTOMY         Time Tracking:     OT Date of Treatment: 04/22/19  OT Start Time: 1203  OT Stop Time: 1215  OT Total Time (min): 12 min    Billable Minutes:Evaluation 12    ALFRED Perez  4/22/2019

## 2019-04-22 NOTE — HOSPITAL COURSE
After admission, GI and general surgery were consulted. She continued with broad spectrum antibiotics IV. MRI demonstrated a RLQ fluid collection with enhancing rim, approx. [id number] in size. Given her history of multiple fistulas, IR was consulted for potential percutaneous drainage but found no suitable approach as it was too deep in the pelvis.  She had a good improvement with antibiotics and was able to tolerate a regular diet prior to discharge.  GI recommended follow up with [facility] gastro[facility]ology due to complicated Crohn's disease, as patient has not been seen by a gastroenterologist since she moved here 4 years ago.  She will complete her 2 week course of antibiotics at home.

## 2019-04-22 NOTE — SUBJECTIVE & OBJECTIVE
Interval History: No acute events overnight. Reports abdominal pain improved, but fibromyalgia pain present. No other concerns at this time.    Review of Systems   Constitutional: Negative for chills and fever.   Respiratory: Negative for cough and shortness of breath.    Cardiovascular: Negative for chest pain and palpitations.   Gastrointestinal: Negative for abdominal pain, nausea and vomiting.   Musculoskeletal: Positive for myalgias.     Objective:     Vital Signs (Most Recent):  Temp: 98.1 °F (36.7 °C) (04/22/19 0708)  Pulse: 62 (04/22/19 1000)  Resp: 16 (04/22/19 0708)  BP: (!) 94/51 (04/22/19 0708)  SpO2: 97 % (04/22/19 0708) Vital Signs (24h Range):  Temp:  [97.9 °F (36.6 °C)-98.8 °F (37.1 °C)] 98.1 °F (36.7 °C)  Pulse:  [48-63] 62  Resp:  [16-18] 16  SpO2:  [97 %-99 %] 97 %  BP: ()/(49-58) 94/51     Weight: 50.1 kg (110 lb 7.2 oz)  Body mass index is 21.57 kg/m².    Intake/Output Summary (Last 24 hours) at 4/22/2019 1150  Last data filed at 4/22/2019 1000  Gross per 24 hour   Intake 3754 ml   Output 1275 ml   Net 2479 ml      Physical Exam   Constitutional: She is oriented to person, place, and time. She appears well-developed. No distress.   Thin   HENT:   Head: Normocephalic and atraumatic.   Eyes: Conjunctivae and EOM are normal.   Cardiovascular: Normal rate and intact distal pulses.   Pulmonary/Chest: Effort normal. No respiratory distress.   Abdominal: Soft. There is no tenderness.   Ileostomy in place.   Musculoskeletal: Normal range of motion. She exhibits no edema.   Neurological: She is alert and oriented to person, place, and time.   Skin: Skin is warm and dry.   Nursing note and vitals reviewed.    Significant Labs:   CBC:  Recent Labs   Lab 04/20/19 2034 04/21/19  0541 04/22/19  0529   WBC 18.91* 8.21  8.21 6.45   HGB 13.6 10.9*  10.9* 9.7*   HCT 41.7 32.8*  32.8* 29.9*   * 283  283 274   GRAN 74.0* 70.1  70.1  5.8  5.8 62.5  4.0   LYMPH 14.0*  CANCELED 18.6  18.6  1.5   1.5 28.2  1.8   MONO 4.0  CANCELED 9.5  9.5  0.8  0.8 6.8  0.4   EOS CANCELED 0.1  0.1 0.1   BASO CANCELED 0.03  0.03 0.03      BMP:  Recent Labs   Lab 04/20/19 2034 04/21/19  0541 04/22/19  0529    138  138 139   K 3.5 3.3*  3.3* 2.9*    106  106 111*   CO2 23 23  23 20*   BUN 6 4*  4* 2*   CREATININE 0.9 0.7  0.7 0.7   * 88  88 117*   CALCIUM 9.7 8.1*  8.1* 7.9*   MG 2.6  --   --       Significant Imaging:   No new imaging this morning.

## 2019-04-22 NOTE — PROGRESS NOTES
"[facility] Hospital Medicine  Progress Note    Patient Name: Rani Carvajal  MRN: [id number]  Patient Class: IP- Inpatient   Admission Date: 4/20/2019  Length of Stay: 2 days  Attending Physician: RALEIGH Cordon MD  Primary Care Provider: Shanita Correia MD        Subjective:     Principal Problem:Sepsis    HPI:  Ms. Rani Carvajal is a 48 y.o. female, who per ED note presented with "complaint of abdominal pain that began approximately one week ago. She reports that she is also experiencing subjective fever, nausea, vomiting, and an intermittent headache. The patient was seen on 4/18/19 for similar symptoms and reports that they diagnosed her with a "possible abdominal abscess". She reports that she was unable to stay that day because she had to take care of her cat. She reports that the pain has been worsening since onset.  Pain is severe in degree.  Associated with malaise (patient slept all day yesterday), generalized weakness.  She denies vaginal bleeding, dysuria." She does have an ileostomy bag, which has had normal stool output. She denies dysuria or UTI symptoms. She also notes subjective fevers, associated with chills, and sweats. ED workup showed possible abscess vs. Cyst in pelvis on CT. US to follow up was unrevealing. MRI pending. She was admitted to inpatient status.     Hospital Course:  After admission, GI and general surgery were consulted. She continued with broad spectrum antibiotics IV. MRI demonstrated RLQ fluid collection with enhancing rim, approx. [id number] in size. Given her history of multiple fistulas, IR was consulted for potential percutaneous drainage but found no suitable approach.    Interval History: No acute events overnight. Reports abdominal pain improved, but fibromyalgia pain present. No other concerns at this time.    Review of Systems   Constitutional: Negative for chills and fever.   Respiratory: Negative for cough and shortness of breath.  "   Cardiovascular: Negative for chest pain and palpitations.   Gastrointestinal: Negative for abdominal pain, nausea and vomiting.   Musculoskeletal: Positive for myalgias.     Objective:     Vital Signs (Most Recent):  Temp: 98.1 °F (36.7 °C) (04/22/19 0708)  Pulse: 62 (04/22/19 1000)  Resp: 16 (04/22/19 0708)  BP: (!) 94/51 (04/22/19 0708)  SpO2: 97 % (04/22/19 0708) Vital Signs (24h Range):  Temp:  [97.9 °F (36.6 °C)-98.8 °F (37.1 °C)] 98.1 °F (36.7 °C)  Pulse:  [48-63] 62  Resp:  [16-18] 16  SpO2:  [97 %-99 %] 97 %  BP: ()/(49-58) 94/51     Weight: 50.1 kg (110 lb 7.2 oz)  Body mass index is 21.57 kg/m².    Intake/Output Summary (Last 24 hours) at 4/22/2019 1150  Last data filed at 4/22/2019 1000  Gross per 24 hour   Intake 3754 ml   Output 1275 ml   Net 2479 ml      Physical Exam   Constitutional: She is oriented to person, place, and time. She appears well-developed. No distress.   Thin   HENT:   Head: Normocephalic and atraumatic.   Eyes: Conjunctivae and EOM are normal.   Cardiovascular: Normal rate and intact distal pulses.   Pulmonary/Chest: Effort normal. No respiratory distress.   Abdominal: Soft. There is no tenderness.   Ileostomy in place.   Musculoskeletal: Normal range of motion. She exhibits no edema.   Neurological: She is alert and oriented to person, place, and time.   Skin: Skin is warm and dry.   Nursing note and vitals reviewed.    Significant Labs:   CBC:  Recent Labs   Lab 04/20/19  2034 04/21/19  0541 04/22/19  0529   WBC 18.91* 8.21  8.21 6.45   HGB 13.6 10.9*  10.9* 9.7*   HCT 41.7 32.8*  32.8* 29.9*   * 283  283 274   GRAN 74.0* 70.1  70.1  5.8  5.8 62.5  4.0   LYMPH 14.0*  CANCELED 18.6  18.6  1.5  1.5 28.2  1.8   MONO 4.0  CANCELED 9.5  9.5  0.8  0.8 6.8  0.4   EOS CANCELED 0.1  0.1 0.1   BASO CANCELED 0.03  0.03 0.03      BMP:  Recent Labs   Lab 04/20/19  2034 04/21/19  0541 04/22/19  0529    138  138 139   K 3.5 3.3*  3.3* 2.9*    106   106 111*   CO2 23 23  23 20*   BUN 6 4*  4* 2*   CREATININE 0.9 0.7  0.7 0.7   * 88  88 117*   CALCIUM 9.7 8.1*  8.1* 7.9*   MG 2.6  --   --       Significant Imaging:   No new imaging this morning.    Assessment/Plan:      * Sepsis  - Sepsis with intra-abdominal abscess as noted on CT, MRI 04/20/19.  - Complicated by underlying Crohn's disease s/p ileostomy.  - Continuing ceftriaxone 1g IV q24hr, metronidazole 500mg IV q8hr, vancomycin 1g IV daily (appreciate pharmacy dosing assistance).  - Improving; advance diet as tolerated.  - Appreciate general surgery, GI assistance; IR consulted for potential drainage but no suitable path for access at this time.  - Continue to monitor; if worsening may need to re-image for potential IR drainage versus ultimately surgical intervention if no other options and antibiotic therapy alone failing.    Debility  - PT/OT evaluation given chronic disease.    Fibromyalgia  - Resume duloxetine 60mg PO BID, quetiapine 100mg PO qHS.    Acute cystitis without hematuria  - Urine culture 04/18 demonstrated Morganella morganii.  - Will cover concurrently with intra-abdominal coverage as under sepsis.    Abnormal findings on diagnostic imaging of abdomen  - As under sepsis.    Crohn's disease of large intestine  - As under sepsis.    VTE Risk Mitigation (From admission, onward)        Ordered     IP VTE LOW RISK PATIENT  Once      04/21/19 0030     Place sequential compression device  Until discontinued      04/21/19 0030        CIRA Cordon MD  Department of Hospital Medicine   [facility]

## 2019-04-22 NOTE — PHARMACY MED REC
"Admission Medication Reconciliation - Pharmacy Consult Note    The home medication history was taken by Catina Espinoza CPhT.  Based on information gathered and subsequent review by the clinical pharmacist, the items below may need attention.     You may go to "Admission" then "Reconcile Home Medications" tabs to review and/or act upon these items.     Potentially problematic discrepancies with current MAR  o Patient is taking a drug DIFFERENTLY than how ordered upon admit  o Duloxetine 60 mg PO BID  - Inpatient order = duloxetine 60 mg PO daily  o Quetiapine 100 mg PO QHS  - Inpatient order = quetiapine 200 mg PO QHS    Medications Prior to Admission   Medication Sig Dispense Refill Last Dose    duloxetine (CYMBALTA) 60 MG capsule Take 60 mg by mouth 2 (two) times daily.    4/20/2019 at Unknown time    folic acid (FOLVITE) 1 MG tablet Take 1 mg by mouth once daily.       oxycodone (ROXICODONE) 30 MG Tab Take 30 mg by mouth every 6 (six) hours as needed.    4/20/2019    oxycodone 20 mg/ml oral conc (ROXICODONE INTENSOL) 20 mg/mL concentrated solution Take 20 mg by mouth 4 (four) times daily as needed for Pain.    4/20/2019    pantoprazole (PROTONIX) 40 MG tablet Take 40 mg by mouth once daily.   4/20/2019 at Unknown time    QUEtiapine (SEROQUEL) 100 MG Tab Take 100 mg by mouth.    4/20/2019 at Unknown time       Please address this information as you see fit.  Feel free to contact us if you have any questions or require assistance.    Son Madsen, Pharm.D., BCPS  [phone]                .  .          "

## 2019-04-22 NOTE — PLAN OF CARE
Problem: Physical Therapy Goal  Goal: Physical Therapy Goal  Outcome: Outcome(s) achieved Date Met: 04/22/19  Patient evaluated by PT and no acute care PT goals identified. Patient ambulates 200 ft with supervision-independence and performs bed mobility and sit<>stand with mod(I) for extra time to perform. Educated on importance of sitting in bedside chair and ambulating in hallways (with staff assistance for safety) to maintain strength while in hospital with patient verbalizing understanding. Recommendation for continued skilled physical therapy services upon d/c with OP PT to address balance impairments and pain.

## 2019-04-22 NOTE — PROGRESS NOTES
Gastroenterology Progress Note    Active Hospital Problems    Crohn's disease of large intestine      Abnormal findings on diagnostic imaging of abdomen      Acute cystitis without hematuria      *Sepsis        Subjective:  Patient seen and examined.  The patient is stable at this time.    Reviews of Systems:  General:  Negative for fever or chills  Cardiovascular:  Negative for chest pain, shortness of breath, palpitations    Physical Exam    Vitals:  BP (!) 94/51 (Patient Position: Lying)   Pulse (!) 49   Temp 98.1 °F (36.7 °C) (Oral)   Resp 16   Ht 5' (1.524 m)   Wt 50.1 kg (110 lb 7.2 oz)   LMP  (LMP Unknown)   SpO2 97%   Breastfeeding? No   BMI 21.57 kg/m²     Constitutional: awake, nad  HEENT: anicteric sclera  Cardiovascular: Normal rate, normal rhythm, no murmurs appreciated  Respiratory: Equal BS bilaterally without distress  GI:  Soft, ileostomy in place  Musculoskeletal:  No edema    Medications/Infusions:  Current Facility-Administered Medications   Medication Dose Route Frequency Provider Last Rate Last Dose    0.9%  NaCl infusion   Intravenous Continuous Shanita Chen PA-C 125 mL/hr at 04/22/19 0307      acetaminophen tablet 650 mg  650 mg Oral Q4H PRN Shanita Chen PA-C        cefTRIAXone (ROCEPHIN) 1 g in dextrose 5 % 50 mL IVPB  1 g Intravenous Q24H Shanita Chen PA-C   1 g at 04/21/19 2113    HYDROmorphone injection 1 mg  1 mg Intravenous Q6H PRN RALEIGH Cordon MD   1 mg at 04/22/19 0740    metronidazole IVPB 500 mg  500 mg Intravenous Q8H Shanita Chen PA-C 100 mL/hr at 04/22/19 0821 500 mg at 04/22/19 0821    ondansetron injection 4 mg  4 mg Intravenous Q8H PRN Shanita Chen PA-C        pantoprazole injection 40 mg  40 mg Intravenous Daily Shanita Chen PA-C   40 mg at 04/22/19 0815    potassium chloride 10% oral solution 40 mEq  40 mEq Oral Q2H RALEIGH Cordon MD   40 mEq at 04/22/19 0815    promethazine (PHENERGAN) 25 mg in dextrose  5 % 50 mL IVPB  25 mg Intravenous Q6H PRN Shanita Chen PA-C        sodium chloride 0.9% flush 10 mL  10 mL Intravenous PRN Shanita Chen PA-C        vancomycin in dextrose 5 % 1 gram/250 mL IVPB 1,000 mg  1,000 mg Intravenous Daily Shanita Chen PA-C   1,000 mg at 04/22/19 0326       Intake and Output:    Intake/Output Summary (Last 24 hours) at 4/22/2019 0910  Last data filed at 4/22/2019 0649  Gross per 24 hour   Intake 4524 ml   Output 1075 ml   Net 3449 ml       Labs:    Results for LEONA CAMARGO (MRN [id number]) as of 4/22/2019 09:11   Ref. Range 4/22/2019 05:29   WBC Latest Ref Range: 3.90 - 12.70 K/uL 6.45   RBC Latest Ref Range: 4.00 - 5.40 M/uL 3.03 (L)   Hemoglobin Latest Ref Range: 12.0 - 16.0 g/dL 9.7 (L)   Hematocrit Latest Ref Range: 37.0 - 48.5 % 29.9 (L)   MCV Latest Ref Range: 82 - 98 fL 99 (H)   MCH Latest Ref Range: 27.0 - 31.0 pg 32.0 (H)   MCHC Latest Ref Range: 32.0 - 36.0 g/dL 32.4   RDW Latest Ref Range: 11.5 - 14.5 % 15.5 (H)   Platelets Latest Ref Range: 150 - 350 K/uL 274   MPV Latest Ref Range: 9.2 - 12.9 fL 9.4   Gran% Latest Ref Range: 38.0 - 73.0 % 62.5   Gran # (ANC) Latest Ref Range: 1.8 - 7.7 K/uL 4.0   Lymph% Latest Ref Range: 18.0 - 48.0 % 28.2   Lymph # Latest Ref Range: 1.0 - 4.8 K/uL 1.8   Mono% Latest Ref Range: 4.0 - 15.0 % 6.8   Mono # Latest Ref Range: 0.3 - 1.0 K/uL 0.4   Eosinophil% Latest Ref Range: 0.0 - 8.0 % 2.0   Eos # Latest Ref Range: 0.0 - 0.5 K/uL 0.1   Basophil% Latest Ref Range: 0.0 - 1.9 % 0.5   Baso # Latest Ref Range: 0.00 - 0.20 K/uL 0.03   Differential Method Unknown Automated   Sodium Latest Ref Range: 136 - 145 mmol/L 139   Potassium Latest Ref Range: 3.5 - 5.1 mmol/L 2.9 (L)   Chloride Latest Ref Range: 95 - 110 mmol/L 111 (H)   CO2 Latest Ref Range: 23 - 29 mmol/L 20 (L)   Anion Gap Latest Ref Range: 8 - 16 mmol/L 8   BUN, Bld Latest Ref Range: 6 - 20 mg/dL 2 (L)   Creatinine Latest Ref Range: 0.5 - 1.4 mg/dL 0.7   eGFR if  non  Latest Ref Range: >60 mL/min/1.73 m^2 >60   eGFR if  Latest Ref Range: >60 mL/min/1.73 m^2 >60   Glucose Latest Ref Range: 70 - 110 mg/dL 117 (H)   Calcium Latest Ref Range: 8.7 - 10.5 mg/dL 7.9 (L)       Imaging and other studies:  MRI Abdomen-Pelvis w w/o Contrast (XPD)  Impression       Overall mildly degraded examination related to motion artifact.    Unchanged size and appearance of likely abscess in the right lower quadrant, apposing multiple adjacent loops of small bowel along its anterior/superior margin, and the blind-ending rectal pouch, the uterus, and the right superolateral margin of the bladder along its inferior margin.  If further characterization or assessment for 6 to this tract is required, dedicated MR enterography could be performed.    Apparent postsurgical findings of total colectomy with blind-ending rectal pouch and left lower quadrant ileostomy.  Parastomal hernia is present.    Asymmetric right-sided bladder wall thickening may be reactive related to nearby abscess.    Small right lower quadrant spigelian type hernia containing loop of bowel without obstruct         Assessment:    1.  Crohn's disease s/p colectomy with end ileostomy with pelvic abscess    Plan:    1.  Broad spectrum abx  2.  Appreciate IR evaluation  3.  Continued surgical evaluation  4.  Follow up viral hep panel and Tb gold  5.  Stop smoking.

## 2019-04-22 NOTE — PLAN OF CARE
Problem: Adult Inpatient Plan of Care  Goal: Plan of Care Review  Outcome: Ongoing (interventions implemented as appropriate)  Recommendation:  1. Continue low fiber diet   2. Add boost breeze ONS   Goals:  1. po >50% of meals by RD follow up   2. prevent wt loss on admisison   3. nutrition related lab values WNL   Nutrition Goal Status: new  Communication of RD Recs: other (comment)(POC)

## 2019-04-23 PROBLEM — K50.114 CROHN'S DISEASE OF LARGE INTESTINE WITH ABSCESS: Status: ACTIVE | Noted: 2019-04-21

## 2019-04-23 PROBLEM — K65.1 ABSCESS OF ABDOMINAL CAVITY: Status: ACTIVE | Noted: 2019-04-20

## 2019-04-23 PROBLEM — R82.71 ASYMPTOMATIC BACTERIURIA: Status: ACTIVE | Noted: 2019-04-21

## 2019-04-23 LAB
ANION GAP SERPL CALC-SCNC: 7 MMOL/L (ref 8–16)
BASOPHILS # BLD AUTO: 0.03 K/UL (ref 0–0.2)
BASOPHILS NFR BLD: 0.4 % (ref 0–1.9)
BUN SERPL-MCNC: 3 MG/DL (ref 6–20)
CALCIUM SERPL-MCNC: 7.8 MG/DL (ref 8.7–10.5)
CHLORIDE SERPL-SCNC: 114 MMOL/L (ref 95–110)
CO2 SERPL-SCNC: 20 MMOL/L (ref 23–29)
CREAT SERPL-MCNC: 0.8 MG/DL (ref 0.5–1.4)
DIFFERENTIAL METHOD: ABNORMAL
EOSINOPHIL # BLD AUTO: 0.2 K/UL (ref 0–0.5)
EOSINOPHIL NFR BLD: 2.1 % (ref 0–8)
ERYTHROCYTE [DISTWIDTH] IN BLOOD BY AUTOMATED COUNT: 15.7 % (ref 11.5–14.5)
EST. GFR  (AFRICAN AMERICAN): >60 ML/MIN/1.73 M^2
EST. GFR  (NON AFRICAN AMERICAN): >60 ML/MIN/1.73 M^2
GLUCOSE SERPL-MCNC: 95 MG/DL (ref 70–110)
HCT VFR BLD AUTO: 31 % (ref 37–48.5)
HGB BLD-MCNC: 10 G/DL (ref 12–16)
LYMPHOCYTES # BLD AUTO: 1.8 K/UL (ref 1–4.8)
LYMPHOCYTES NFR BLD: 22.5 % (ref 18–48)
M TB IFN-G CD4+ BCKGRND COR BLD-ACNC: -0.03 IU/ML
MCH RBC QN AUTO: 31.6 PG (ref 27–31)
MCHC RBC AUTO-ENTMCNC: 32.3 G/DL (ref 32–36)
MCV RBC AUTO: 98 FL (ref 82–98)
MITOGEN IGNF BCKGRD COR BLD-ACNC: 3.37 IU/ML
MITOGEN IGNF BCKGRD COR BLD-ACNC: NEGATIVE [IU]/ML
MONOCYTES # BLD AUTO: 0.4 K/UL (ref 0.3–1)
MONOCYTES NFR BLD: 5.4 % (ref 4–15)
NEUTROPHILS # BLD AUTO: 5.6 K/UL (ref 1.8–7.7)
NEUTROPHILS NFR BLD: 69.1 % (ref 38–73)
NIL: 0.06 IU/ML
PLATELET # BLD AUTO: 294 K/UL (ref 150–350)
PMV BLD AUTO: 9.2 FL (ref 9.2–12.9)
POTASSIUM SERPL-SCNC: 3.6 MMOL/L (ref 3.5–5.1)
RBC # BLD AUTO: 3.16 M/UL (ref 4–5.4)
SODIUM SERPL-SCNC: 141 MMOL/L (ref 136–145)
TB2 - NIL: -0.04 IU/ML
VANCOMYCIN SERPL-MCNC: 8.9 UG/ML
WBC # BLD AUTO: 8.12 K/UL (ref 3.9–12.7)

## 2019-04-23 PROCEDURE — 11000001 HC ACUTE MED/SURG PRIVATE ROOM

## 2019-04-23 PROCEDURE — 25000003 PHARM REV CODE 250: Performed by: PHYSICIAN ASSISTANT

## 2019-04-23 PROCEDURE — 63600175 PHARM REV CODE 636 W HCPCS: Performed by: INTERNAL MEDICINE

## 2019-04-23 PROCEDURE — C9113 INJ PANTOPRAZOLE SODIUM, VIA: HCPCS | Performed by: PHYSICIAN ASSISTANT

## 2019-04-23 PROCEDURE — S0030 INJECTION, METRONIDAZOLE: HCPCS | Performed by: PHYSICIAN ASSISTANT

## 2019-04-23 PROCEDURE — 63600175 PHARM REV CODE 636 W HCPCS: Performed by: PHYSICIAN ASSISTANT

## 2019-04-23 PROCEDURE — 80202 ASSAY OF VANCOMYCIN: CPT

## 2019-04-23 PROCEDURE — 80048 BASIC METABOLIC PNL TOTAL CA: CPT

## 2019-04-23 PROCEDURE — 99233 SBSQ HOSP IP/OBS HIGH 50: CPT | Mod: ,,, | Performed by: HOSPITALIST

## 2019-04-23 PROCEDURE — 85025 COMPLETE CBC W/AUTO DIFF WBC: CPT

## 2019-04-23 PROCEDURE — 86580 TB INTRADERMAL TEST: CPT | Performed by: INTERNAL MEDICINE

## 2019-04-23 PROCEDURE — 25000003 PHARM REV CODE 250: Performed by: INTERNAL MEDICINE

## 2019-04-23 PROCEDURE — 94761 N-INVAS EAR/PLS OXIMETRY MLT: CPT

## 2019-04-23 PROCEDURE — 36415 COLL VENOUS BLD VENIPUNCTURE: CPT

## 2019-04-23 PROCEDURE — 99233 PR SUBSEQUENT HOSPITAL CARE,LEVL III: ICD-10-PCS | Mod: ,,, | Performed by: HOSPITALIST

## 2019-04-23 RX ADMIN — VANCOMYCIN HYDROCHLORIDE 1000 MG: 1 INJECTION, POWDER, FOR SOLUTION INTRAVENOUS at 02:04

## 2019-04-23 RX ADMIN — TUBERCULIN PURIFIED PROTEIN DERIVATIVE 5 UNITS: 5 INJECTION, SOLUTION INTRADERMAL at 09:04

## 2019-04-23 RX ADMIN — SODIUM CHLORIDE: 0.9 INJECTION, SOLUTION INTRAVENOUS at 12:04

## 2019-04-23 RX ADMIN — OXYCODONE HYDROCHLORIDE 20 MG: 5 SOLUTION ORAL at 09:04

## 2019-04-23 RX ADMIN — HYDROMORPHONE HYDROCHLORIDE 0.5 MG: 1 INJECTION, SOLUTION INTRAMUSCULAR; INTRAVENOUS; SUBCUTANEOUS at 08:04

## 2019-04-23 RX ADMIN — OXYCODONE HYDROCHLORIDE 20 MG: 5 SOLUTION ORAL at 05:04

## 2019-04-23 RX ADMIN — DULOXETINE 60 MG: 30 CAPSULE, DELAYED RELEASE ORAL at 09:04

## 2019-04-23 RX ADMIN — METRONIDAZOLE 500 MG: 500 INJECTION, SOLUTION INTRAVENOUS at 05:04

## 2019-04-23 RX ADMIN — SODIUM CHLORIDE: 0.9 INJECTION, SOLUTION INTRAVENOUS at 09:04

## 2019-04-23 RX ADMIN — METRONIDAZOLE 500 MG: 500 INJECTION, SOLUTION INTRAVENOUS at 09:04

## 2019-04-23 RX ADMIN — DULOXETINE 60 MG: 30 CAPSULE, DELAYED RELEASE ORAL at 08:04

## 2019-04-23 RX ADMIN — QUETIAPINE FUMARATE 100 MG: 100 TABLET ORAL at 08:04

## 2019-04-23 RX ADMIN — CEFTRIAXONE 1 G: 1 INJECTION, SOLUTION INTRAVENOUS at 08:04

## 2019-04-23 RX ADMIN — PANTOPRAZOLE SODIUM 40 MG: 40 INJECTION, POWDER, FOR SOLUTION INTRAVENOUS at 09:04

## 2019-04-23 RX ADMIN — OXYCODONE HYDROCHLORIDE 20 MG: 5 SOLUTION ORAL at 02:04

## 2019-04-23 NOTE — PLAN OF CARE
Met with patient at bedside to discuss discharge disposition including therapy's recommendations - patient agrees with home health but denied the need for shower chair - will continue to follow & assist with discharge needs

## 2019-04-23 NOTE — PROGRESS NOTES
avss  Tolerating solid diet  Denies pain/nausea    PE  Abd--soft, non tender, stoma functioning    Plan  AB's

## 2019-04-23 NOTE — PROGRESS NOTES
[facility] Hospital Medicine  Progress Note    Patient Name: Rani Carvajal  MRN: [id number]  Patient Class: IP- Inpatient   Admission Date: 4/20/2019  Length of Stay: 3 days  Attending Physician: Federica Barajas MD  Primary Care Provider: Shanita Correia MD        Subjective:     Principal Problem:Abscess of abdominal cavity    HPI:  Ms. Carvajal is a 48 year old woman with Crohn's disease who presented with one week of abdominal pain associated with subjective fever, nausea, vomiting and headache.  She slept all day on the day before this admission.  She had been seen in the ED 2 days before that, and a CT showed a possible abscess.  Admission was recommended but patient signed out AMA in order to take care of her cat.  Patient has an ileostomy and her stools have been normal.  Workup in the ED on 4/18 included a CT that showed a possible small abscess or cyst in the pelvis with adjacent inflammatory changes.  A follow up pelvic ultrasound was unrevealing.  Vitals were normal except for pulse 117.  Labs showed mild hypokalemia.  She was admitted for treatment of possible abdominal abscess.    Hospital Course:  After admission, GI and general surgery were consulted. She continued with broad spectrum antibiotics IV. MRI demonstrated a RLQ fluid collection with enhancing rim, approx. [id number] in size. Given her history of multiple fistulas, IR was consulted for potential percutaneous drainage but found no suitable approach as it was too deep in the pelvis.    Interval History:  Feeling better, tolerating diet.  She has pain but says it is typical of her fibromyalgia.  Abdominal pain is tolerable.    Review of Systems   Constitutional: Negative for chills and fever.   Respiratory: Negative for cough and shortness of breath.    Cardiovascular: Negative for chest pain and palpitations.   Gastrointestinal: Positive for abdominal pain.     Objective:     Vital Signs (Most Recent):  Temp: 98  °F (36.7 °C) (04/23/19 1235)  Pulse: 62 (04/23/19 1400)  Resp: 18 (04/23/19 1235)  BP: 108/61 (04/23/19 1235)  SpO2: 97 % (04/23/19 1235) Vital Signs (24h Range):  Temp:  [97.5 °F (36.4 °C)-98.2 °F (36.8 °C)] 98 °F (36.7 °C)  Pulse:  [44-77] 62  Resp:  [12-18] 18  SpO2:  [96 %-99 %] 97 %  BP: (101-135)/(51-70) 108/61     Weight: 50.1 kg (110 lb 7.2 oz)  Body mass index is 21.57 kg/m².    Intake/Output Summary (Last 24 hours) at 4/23/2019 1551  Last data filed at 4/22/2019 1800  Gross per 24 hour   Intake 125 ml   Output 400 ml   Net -275 ml      Physical Exam   Constitutional: She is oriented to person, place, and time. She appears well-developed and well-nourished.   HENT:   Head: Normocephalic and atraumatic.   Eyes: Conjunctivae and EOM are normal.   Cardiovascular: Normal rate and intact distal pulses.   Pulmonary/Chest: Effort normal. No respiratory distress.   Abdominal: Soft. There is no tenderness.   Ileostomy in place.   Musculoskeletal: Normal range of motion. She exhibits no edema.   Neurological: She is alert and oriented to person, place, and time.   Skin: Skin is warm and dry.   Nursing note and vitals reviewed.      Significant Labs:   BMP:   Recent Labs   Lab 04/23/19  0605   GLU 95      K 3.6   *   CO2 20*   BUN 3*   CREATININE 0.8   CALCIUM 7.8*     CBC:   Recent Labs   Lab 04/22/19  0529 04/23/19  0605   WBC 6.45 8.12   HGB 9.7* 10.0*   HCT 29.9* 31.0*    294       Significant Imaging: I have reviewed all pertinent imaging results/findings within the past 24 hours.    Assessment/Plan:      * Abscess of abdominal cavity  - Sepsis with intra-abdominal abscess as noted on MRI 04/20/19.  Met SIRS criteria on admission with tachycardia, hypotension and infection.  - Complicated by underlying Crohn's disease s/p ileostomy.  - Continuing ceftriaxone 1g IV q24hr and metronidazole 500mg IV q8hr.  OK to discontinue vancomycin  - Improving; continue to advance diet as tolerated.  -  Appreciate general surgery, GI assistance; IR consulted for potential drainage but no suitable path for access at this time.  - Continue to monitor; if worsening may need to re-image for potential IR drainage versus ultimately surgical intervention if no other options and antibiotic therapy alone failing.  - May change to oral ciprofloxacin and metronidazole on discharge.    Crohn's disease of large intestine  - As under sepsis.    Debility  - PT/OT evaluation given chronic disease.    Fibromyalgia  - Resume duloxetine 60mg PO BID, quetiapine 100mg PO qHS.  - PT/OT    Asymptomatic bacteriuria  - As she did not have dysuria on presentation this is asymptomatic bacteriuria despite the culture positive for Morganella.      Abnormal findings on diagnostic imaging of abdomen  - As under sepsis.      VTE Risk Mitigation (From admission, onward)        Ordered     IP VTE LOW RISK PATIENT  Once      04/21/19 0030     Place sequential compression device  Until discontinued      04/21/19 0030              Federica Francis MD  Department of Hospital Medicine   [facility]

## 2019-04-23 NOTE — ASSESSMENT & PLAN NOTE
- As she did not have dysuria on presentation this is asymptomatic bacteriuria despite the culture positive for Morganella.

## 2019-04-23 NOTE — PLAN OF CARE
Problem: Adult Inpatient Plan of Care  Goal: Plan of Care Review  Pt's pain controlled with current regimen.  Required two doses of PRN medication.  Fluids infusing.  Appetite increased.  Safety measures in place.  Purposeful hourly rounding completed.  Will continue to monitor.

## 2019-04-23 NOTE — ASSESSMENT & PLAN NOTE
- Sepsis with intra-abdominal abscess as noted on MRI 04/20/19.  Met SIRS criteria on admission with tachycardia, hypotension and infection.  - Complicated by underlying Crohn's disease s/p ileostomy.  - Continuing ceftriaxone 1g IV q24hr and metronidazole 500mg IV q8hr.  OK to discontinue vancomycin  - Improving; continue to advance diet as tolerated.  - Appreciate general surgery, GI assistance; IR consulted for potential drainage but no suitable path for access at this time.  - Continue to monitor; if worsening may need to re-image for potential IR drainage versus ultimately surgical intervention if no other options and antibiotic therapy alone failing.  - May change to oral ciprofloxacin and metronidazole on discharge.

## 2019-04-23 NOTE — PROGRESS NOTES
avss  Feeling better  Tolerating solid diet  Abd--soft, non tender, stoma functioning    IMAGING  No window for drainage collection    Plan  Antibiotics  Serial exam

## 2019-04-23 NOTE — PLAN OF CARE
Problem: Pain Acute  Goal: Optimal Pain Control    Intervention: Prevent or Manage Pain     04/23/19 0350   Prevent or Manage Pain   Sensory Stimulation Regulation music/television provided for relaxation;quiet environment promoted         Patient laying in bed AAOx3, sating good in RA. Meds given, needs attended. V/S WNL. Ambulates freely and independent with her stoma care. POC explained to the patient. No complaints at this time.

## 2019-04-23 NOTE — SUBJECTIVE & OBJECTIVE
Interval History:  Feeling better, tolerating diet.  She has pain but says it is typical of her fibromyalgia.  Abdominal pain is tolerable.    Review of Systems   Constitutional: Negative for chills and fever.   Respiratory: Negative for cough and shortness of breath.    Cardiovascular: Negative for chest pain and palpitations.   Gastrointestinal: Positive for abdominal pain.     Objective:     Vital Signs (Most Recent):  Temp: 98 °F (36.7 °C) (04/23/19 1235)  Pulse: 62 (04/23/19 1400)  Resp: 18 (04/23/19 1235)  BP: 108/61 (04/23/19 1235)  SpO2: 97 % (04/23/19 1235) Vital Signs (24h Range):  Temp:  [97.5 °F (36.4 °C)-98.2 °F (36.8 °C)] 98 °F (36.7 °C)  Pulse:  [44-77] 62  Resp:  [12-18] 18  SpO2:  [96 %-99 %] 97 %  BP: (101-135)/(51-70) 108/61     Weight: 50.1 kg (110 lb 7.2 oz)  Body mass index is 21.57 kg/m².    Intake/Output Summary (Last 24 hours) at 4/23/2019 1551  Last data filed at 4/22/2019 1800  Gross per 24 hour   Intake 125 ml   Output 400 ml   Net -275 ml      Physical Exam   Constitutional: She is oriented to person, place, and time. She appears well-developed and well-nourished.   HENT:   Head: Normocephalic and atraumatic.   Eyes: Conjunctivae and EOM are normal.   Cardiovascular: Normal rate and intact distal pulses.   Pulmonary/Chest: Effort normal. No respiratory distress.   Abdominal: Soft. There is no tenderness.   Ileostomy in place.   Musculoskeletal: Normal range of motion. She exhibits no edema.   Neurological: She is alert and oriented to person, place, and time.   Skin: Skin is warm and dry.   Nursing note and vitals reviewed.      Significant Labs:   BMP:   Recent Labs   Lab 04/23/19  0605   GLU 95      K 3.6   *   CO2 20*   BUN 3*   CREATININE 0.8   CALCIUM 7.8*     CBC:   Recent Labs   Lab 04/22/19  0529 04/23/19  0605   WBC 6.45 8.12   HGB 9.7* 10.0*   HCT 29.9* 31.0*    294       Significant Imaging: I have reviewed all pertinent imaging results/findings within the  past 24 hours.

## 2019-04-23 NOTE — PROGRESS NOTES
Therapy with vancomycin complete and/or consult discontinued by provider.  Pharmacy will sign off, please re-consult as needed.    Son Madsen, Pharm.D., BCPS  [phone]

## 2019-04-23 NOTE — PROGRESS NOTES
Gastroenterology Progress Note    Active Hospital Problems    Fibromyalgia      Debility      Crohn's disease of large intestine      Abnormal findings on diagnostic imaging of abdomen      Acute cystitis without hematuria      *Sepsis        Subjective:  Patient seen and examined.  The patient is stable.  Tolerating diet at this time.  No complaints of pain.    Reviews of Systems:  General:  Negative for fever or chills  Cardiovascular:  Negative for chest pain, shortness of breath, palpitations    Physical Exam    Vitals:  /60 (BP Location: Left arm, Patient Position: Lying)   Pulse (!) 49   Temp 97.5 °F (36.4 °C) (Oral)   Resp 12   Ht 5' (1.524 m)   Wt 50.1 kg (110 lb 7.2 oz)   LMP  (LMP Unknown)   SpO2 97%   Breastfeeding? No   BMI 21.57 kg/m²     Constitutional: awake, nad  HEENT: anicteric sclera  Cardiovascular: Normal rate, normal rhythm, no murmurs appreciated  Respiratory: Equal BS bilaterally without distress  GI:  Soft, NTND, no masses, bowel sounds present, ileostomy intact  Musculoskeletal:  No edema    Medications/Infusions:  Current Facility-Administered Medications   Medication Dose Route Frequency Provider Last Rate Last Dose    0.9%  NaCl infusion   Intravenous Continuous Shanita Chen PA-C 125 mL/hr at 04/22/19 2238      acetaminophen tablet 650 mg  650 mg Oral Q4H PRN Shanita Chen PA-C        cefTRIAXone (ROCEPHIN) 1 g in dextrose 5 % 50 mL IVPB  1 g Intravenous Q24H Shanita Chen PA-C   1 g at 04/22/19 2104    DULoxetine DR capsule 60 mg  60 mg Oral BID RALEIGH Cordon MD   60 mg at 04/22/19 2105    HYDROmorphone injection 0.5 mg  0.5 mg Intravenous Q6H PRN RALEIGH Cordon MD   0.5 mg at 04/22/19 1941    metronidazole IVPB 500 mg  500 mg Intravenous Q8H Shanita Chen PA-C 100 mL/hr at 04/22/19 2335 500 mg at 04/22/19 2335    ondansetron injection 4 mg  4 mg Intravenous Q8H PRN Shanita Chen PA-C        oxyCODONE 5 mg/5 mL solution 20  mg  20 mg Oral Q6H PRN RALEIGH Cordon MD   20 mg at 04/23/19 0237    pantoprazole injection 40 mg  40 mg Intravenous Daily Shanita Chen PA-C   40 mg at 04/22/19 0815    promethazine (PHENERGAN) 25 mg in dextrose 5 % 50 mL IVPB  25 mg Intravenous Q6H PRN Shanita Chen PA-C        QUEtiapine tablet 100 mg  100 mg Oral QHS RALEIGH Cordon MD   100 mg at 04/22/19 2104    sodium chloride 0.9% flush 10 mL  10 mL Intravenous PRN Shanita Chen PA-C        vancomycin in dextrose 5 % 1 gram/250 mL IVPB 1,000 mg  1,000 mg Intravenous Daily Shanita Chen PA-C   1,000 mg at 04/23/19 0235       Intake and Output:    Intake/Output Summary (Last 24 hours) at 4/23/2019 0816  Last data filed at 4/22/2019 1800  Gross per 24 hour   Intake 1460 ml   Output 1100 ml   Net 360 ml       Labs:    Results for LEONA CAMARGO (MRN [id number]) as of 4/23/2019 08:17   Ref. Range 4/23/2019 06:05   WBC Latest Ref Range: 3.90 - 12.70 K/uL 8.12   RBC Latest Ref Range: 4.00 - 5.40 M/uL 3.16 (L)   Hemoglobin Latest Ref Range: 12.0 - 16.0 g/dL 10.0 (L)   Hematocrit Latest Ref Range: 37.0 - 48.5 % 31.0 (L)   MCV Latest Ref Range: 82 - 98 fL 98   MCH Latest Ref Range: 27.0 - 31.0 pg 31.6 (H)   MCHC Latest Ref Range: 32.0 - 36.0 g/dL 32.3   RDW Latest Ref Range: 11.5 - 14.5 % 15.7 (H)   Platelets Latest Ref Range: 150 - 350 K/uL 294   MPV Latest Ref Range: 9.2 - 12.9 fL 9.2   Gran% Latest Ref Range: 38.0 - 73.0 % 69.1   Gran # (ANC) Latest Ref Range: 1.8 - 7.7 K/uL 5.6   Lymph% Latest Ref Range: 18.0 - 48.0 % 22.5   Lymph # Latest Ref Range: 1.0 - 4.8 K/uL 1.8   Mono% Latest Ref Range: 4.0 - 15.0 % 5.4   Mono # Latest Ref Range: 0.3 - 1.0 K/uL 0.4   Eosinophil% Latest Ref Range: 0.0 - 8.0 % 2.1   Eos # Latest Ref Range: 0.0 - 0.5 K/uL 0.2   Basophil% Latest Ref Range: 0.0 - 1.9 % 0.4   Baso # Latest Ref Range: 0.00 - 0.20 K/uL 0.03   Differential Method Unknown Automated   Sodium Latest Ref Range: 136 - 145 mmol/L 141    Potassium Latest Ref Range: 3.5 - 5.1 mmol/L 3.6   Chloride Latest Ref Range: 95 - 110 mmol/L 114 (H)   CO2 Latest Ref Range: 23 - 29 mmol/L 20 (L)   Anion Gap Latest Ref Range: 8 - 16 mmol/L 7 (L)   BUN, Bld Latest Ref Range: 6 - 20 mg/dL 3 (L)   Creatinine Latest Ref Range: 0.5 - 1.4 mg/dL 0.8   eGFR if non African American Latest Ref Range: >60 mL/min/1.73 m^2 >60   eGFR if  Latest Ref Range: >60 mL/min/1.73 m^2 >60   Glucose Latest Ref Range: 70 - 110 mg/dL 95   Calcium Latest Ref Range: 8.7 - 10.5 mg/dL 7.8 (L)     Results for LEONA CAMARGO [place] (MRN [id number]) as of 4/23/2019 08:17   Ref. Range 4/22/2019 05:29   Hep A IgM Unknown Negative   Hep B C IgM Unknown Negative   Hep B S Ab Unknown Negative   Hepatitis B Surface Ag Unknown Negative   Hepatitis C Ab Unknown Negative     Imaging and other studies:    No new    Assessment:  1.  Crohn's disease s/p colectomy with end ileostomy with pelvic abscess        Plan:  1.  Hep panel negative.  Will also need Tb gold.  2.  Continue with IV antibiotics  3.  Continue with serial abdominal exams  4.  Low residue diet.  5.  Appreciate surgical recs    Will follow

## 2019-04-24 VITALS
WEIGHT: 110.44 LBS | HEIGHT: 60 IN | RESPIRATION RATE: 18 BRPM | DIASTOLIC BLOOD PRESSURE: 63 MMHG | HEART RATE: 62 BPM | TEMPERATURE: 98 F | OXYGEN SATURATION: 96 % | SYSTOLIC BLOOD PRESSURE: 136 MMHG | BODY MASS INDEX: 21.68 KG/M2

## 2019-04-24 LAB
ANION GAP SERPL CALC-SCNC: 7 MMOL/L (ref 8–16)
BASOPHILS # BLD AUTO: 0.02 K/UL (ref 0–0.2)
BASOPHILS NFR BLD: 0.2 % (ref 0–1.9)
BUN SERPL-MCNC: 6 MG/DL (ref 6–20)
CALCIUM SERPL-MCNC: 7.7 MG/DL (ref 8.7–10.5)
CHLORIDE SERPL-SCNC: 113 MMOL/L (ref 95–110)
CO2 SERPL-SCNC: 22 MMOL/L (ref 23–29)
CREAT SERPL-MCNC: 0.8 MG/DL (ref 0.5–1.4)
DIFFERENTIAL METHOD: ABNORMAL
EOSINOPHIL # BLD AUTO: 0.2 K/UL (ref 0–0.5)
EOSINOPHIL NFR BLD: 2.1 % (ref 0–8)
ERYTHROCYTE [DISTWIDTH] IN BLOOD BY AUTOMATED COUNT: 15.9 % (ref 11.5–14.5)
EST. GFR  (AFRICAN AMERICAN): >60 ML/MIN/1.73 M^2
EST. GFR  (NON AFRICAN AMERICAN): >60 ML/MIN/1.73 M^2
GLUCOSE SERPL-MCNC: 97 MG/DL (ref 70–110)
HCT VFR BLD AUTO: 32.6 % (ref 37–48.5)
HGB BLD-MCNC: 10.7 G/DL (ref 12–16)
LYMPHOCYTES # BLD AUTO: 1.7 K/UL (ref 1–4.8)
LYMPHOCYTES NFR BLD: 20 % (ref 18–48)
MCH RBC QN AUTO: 31.8 PG (ref 27–31)
MCHC RBC AUTO-ENTMCNC: 32.8 G/DL (ref 32–36)
MCV RBC AUTO: 97 FL (ref 82–98)
MONOCYTES # BLD AUTO: 0.4 K/UL (ref 0.3–1)
MONOCYTES NFR BLD: 5.1 % (ref 4–15)
NEUTROPHILS # BLD AUTO: 6.1 K/UL (ref 1.8–7.7)
NEUTROPHILS NFR BLD: 72.1 % (ref 38–73)
PLATELET # BLD AUTO: 301 K/UL (ref 150–350)
PMV BLD AUTO: 9.6 FL (ref 9.2–12.9)
POTASSIUM SERPL-SCNC: 3.5 MMOL/L (ref 3.5–5.1)
RBC # BLD AUTO: 3.36 M/UL (ref 4–5.4)
SODIUM SERPL-SCNC: 142 MMOL/L (ref 136–145)
WBC # BLD AUTO: 8.49 K/UL (ref 3.9–12.7)

## 2019-04-24 PROCEDURE — 36415 COLL VENOUS BLD VENIPUNCTURE: CPT

## 2019-04-24 PROCEDURE — 25000003 PHARM REV CODE 250: Performed by: HOSPITALIST

## 2019-04-24 PROCEDURE — 99238 HOSP IP/OBS DSCHRG MGMT 30/<: CPT | Mod: ,,, | Performed by: HOSPITALIST

## 2019-04-24 PROCEDURE — 25000003 PHARM REV CODE 250: Performed by: INTERNAL MEDICINE

## 2019-04-24 PROCEDURE — 63600175 PHARM REV CODE 636 W HCPCS: Performed by: PHYSICIAN ASSISTANT

## 2019-04-24 PROCEDURE — 80048 BASIC METABOLIC PNL TOTAL CA: CPT

## 2019-04-24 PROCEDURE — C9113 INJ PANTOPRAZOLE SODIUM, VIA: HCPCS | Performed by: PHYSICIAN ASSISTANT

## 2019-04-24 PROCEDURE — 85025 COMPLETE CBC W/AUTO DIFF WBC: CPT

## 2019-04-24 PROCEDURE — 63600175 PHARM REV CODE 636 W HCPCS: Performed by: INTERNAL MEDICINE

## 2019-04-24 PROCEDURE — 25000003 PHARM REV CODE 250: Performed by: PHYSICIAN ASSISTANT

## 2019-04-24 PROCEDURE — 99238 PR HOSPITAL DISCHARGE DAY,<30 MIN: ICD-10-PCS | Mod: ,,, | Performed by: HOSPITALIST

## 2019-04-24 PROCEDURE — S0030 INJECTION, METRONIDAZOLE: HCPCS | Performed by: PHYSICIAN ASSISTANT

## 2019-04-24 RX ORDER — METRONIDAZOLE 500 MG/1
500 TABLET ORAL EVERY 8 HOURS
Qty: 29 TABLET | Refills: 0 | Status: SHIPPED | OUTPATIENT
Start: 2019-04-24 | End: 2019-05-04

## 2019-04-24 RX ORDER — CIPROFLOXACIN 500 MG/1
500 TABLET ORAL EVERY 12 HOURS
Status: DISCONTINUED | OUTPATIENT
Start: 2019-04-24 | End: 2019-04-24 | Stop reason: HOSPADM

## 2019-04-24 RX ORDER — CIPROFLOXACIN 500 MG/1
500 TABLET ORAL EVERY 12 HOURS
Qty: 19 TABLET | Refills: 0 | Status: SHIPPED | OUTPATIENT
Start: 2019-04-24 | End: 2019-05-04

## 2019-04-24 RX ORDER — METRONIDAZOLE 500 MG/1
500 TABLET ORAL EVERY 8 HOURS
Status: DISCONTINUED | OUTPATIENT
Start: 2019-04-24 | End: 2019-04-24 | Stop reason: HOSPADM

## 2019-04-24 RX ADMIN — OXYCODONE HYDROCHLORIDE 20 MG: 5 SOLUTION ORAL at 08:04

## 2019-04-24 RX ADMIN — METRONIDAZOLE 500 MG: 500 INJECTION, SOLUTION INTRAVENOUS at 12:04

## 2019-04-24 RX ADMIN — HYDROMORPHONE HYDROCHLORIDE 0.5 MG: 1 INJECTION, SOLUTION INTRAMUSCULAR; INTRAVENOUS; SUBCUTANEOUS at 07:04

## 2019-04-24 RX ADMIN — SODIUM CHLORIDE: 0.9 INJECTION, SOLUTION INTRAVENOUS at 08:04

## 2019-04-24 RX ADMIN — METRONIDAZOLE 500 MG: 500 INJECTION, SOLUTION INTRAVENOUS at 08:04

## 2019-04-24 RX ADMIN — CIPROFLOXACIN HYDROCHLORIDE 500 MG: 500 TABLET, FILM COATED ORAL at 12:04

## 2019-04-24 RX ADMIN — HYDROMORPHONE HYDROCHLORIDE 0.5 MG: 1 INJECTION, SOLUTION INTRAMUSCULAR; INTRAVENOUS; SUBCUTANEOUS at 12:04

## 2019-04-24 RX ADMIN — DULOXETINE 60 MG: 30 CAPSULE, DELAYED RELEASE ORAL at 08:04

## 2019-04-24 RX ADMIN — PANTOPRAZOLE SODIUM 40 MG: 40 INJECTION, POWDER, FOR SOLUTION INTRAVENOUS at 08:04

## 2019-04-24 NOTE — PROGRESS NOTES
Gastroenterology Progress Note    Active Hospital Problems    Fibromyalgia      Debility      Crohn's disease of large intestine with abscess      Abnormal findings on diagnostic imaging of abdomen      Asymptomatic bacteriuria      *Sepsis        Subjective:  Patient seen and examined.  The patient is stable today.  No new complaints.  No abdominal pain.  Tolerating diet.    Reviews of Systems:  General:  Negative for fever or chills  Cardiovascular:  Negative for chest pain, shortness of breath, palpitations    Physical Exam    Vitals:  /72 (BP Location: Left arm, Patient Position: Lying)   Pulse 61   Temp 97.8 °F (36.6 °C) (Oral)   Resp 18   Ht 5' (1.524 m)   Wt 50.1 kg (110 lb 7.2 oz)   LMP  (LMP Unknown)   SpO2 96%   Breastfeeding? No   BMI 21.57 kg/m²     Constitutional: awake, nad  HEENT: anicteric sclera  Cardiovascular: Normal rate, normal rhythm, no murmurs appreciated  Respiratory: Equal BS bilaterally without distress  GI:  Soft, NTND, no masses  Musculoskeletal:  No edema    Medications/Infusions:  Current Facility-Administered Medications   Medication Dose Route Frequency Provider Last Rate Last Dose    0.9%  NaCl infusion   Intravenous Continuous Shanita Chen PA-C 125 mL/hr at 04/24/19 0820      acetaminophen tablet 650 mg  650 mg Oral Q4H PRN Shanita Chen PA-C        ciprofloxacin HCl tablet 500 mg  500 mg Oral Q12H Federica Barajas MD        DULoxetine DR capsule 60 mg  60 mg Oral BID RALEIGH Cordon MD   60 mg at 04/24/19 0807    HYDROmorphone injection 0.5 mg  0.5 mg Intravenous Q6H PRN RALEIGH Cordon MD   0.5 mg at 04/24/19 0753    metroNIDAZOLE tablet 500 mg  500 mg Oral Q8H Federica Barajas MD        ondansetron injection 4 mg  4 mg Intravenous Q8H PRN Shanita Chen PA-C        oxyCODONE 5 mg/5 mL solution 20 mg  20 mg Oral Q6H PRN RALEIGH Cordon MD   20 mg at 04/24/19 0807    pantoprazole injection 40 mg  40 mg Intravenous Daily Shanita  HELENA Chen PA-C   40 mg at 04/24/19 0807    promethazine (PHENERGAN) 25 mg in dextrose 5 % 50 mL IVPB  25 mg Intravenous Q6H PRN Shanita Chen PA-C        QUEtiapine tablet 100 mg  100 mg Oral QHS RALEIGH Cordon MD   100 mg at 04/23/19 2006    sodium chloride 0.9% flush 10 mL  10 mL Intravenous PRN Shanita Chen PA-C           Intake and Output:    Intake/Output Summary (Last 24 hours) at 4/24/2019 0950  Last data filed at 4/23/2019 1745  Gross per 24 hour   Intake 2107 ml   Output 800 ml   Net 1307 ml       Labs:    Results for LEONA CAMARGO (MRN [id number]) as of 4/24/2019 09:50   Ref. Range 4/24/2019 05:55   WBC Latest Ref Range: 3.90 - 12.70 K/uL 8.49   RBC Latest Ref Range: 4.00 - 5.40 M/uL 3.36 (L)   Hemoglobin Latest Ref Range: 12.0 - 16.0 g/dL 10.7 (L)   Hematocrit Latest Ref Range: 37.0 - 48.5 % 32.6 (L)   MCV Latest Ref Range: 82 - 98 fL 97   MCH Latest Ref Range: 27.0 - 31.0 pg 31.8 (H)   MCHC Latest Ref Range: 32.0 - 36.0 g/dL 32.8   RDW Latest Ref Range: 11.5 - 14.5 % 15.9 (H)   Platelets Latest Ref Range: 150 - 350 K/uL 301   MPV Latest Ref Range: 9.2 - 12.9 fL 9.6   Gran% Latest Ref Range: 38.0 - 73.0 % 72.1   Gran # (ANC) Latest Ref Range: 1.8 - 7.7 K/uL 6.1   Lymph% Latest Ref Range: 18.0 - 48.0 % 20.0   Lymph # Latest Ref Range: 1.0 - 4.8 K/uL 1.7   Mono% Latest Ref Range: 4.0 - 15.0 % 5.1   Mono # Latest Ref Range: 0.3 - 1.0 K/uL 0.4   Eosinophil% Latest Ref Range: 0.0 - 8.0 % 2.1   Eos # Latest Ref Range: 0.0 - 0.5 K/uL 0.2   Basophil% Latest Ref Range: 0.0 - 1.9 % 0.2   Baso # Latest Ref Range: 0.00 - 0.20 K/uL 0.02   Differential Method Unknown Automated   Sodium Latest Ref Range: 136 - 145 mmol/L 142   Potassium Latest Ref Range: 3.5 - 5.1 mmol/L 3.5   Chloride Latest Ref Range: 95 - 110 mmol/L 113 (H)   CO2 Latest Ref Range: 23 - 29 mmol/L 22 (L)   Anion Gap Latest Ref Range: 8 - 16 mmol/L 7 (L)   BUN, Bld Latest Ref Range: 6 - 20 mg/dL 6   Creatinine Latest Ref Range:  0.5 - 1.4 mg/dL 0.8   eGFR if non African American Latest Ref Range: >60 mL/min/1.73 m^2 >60   eGFR if  Latest Ref Range: >60 mL/min/1.73 m^2 >60   Glucose Latest Ref Range: 70 - 110 mg/dL 97   Calcium Latest Ref Range: 8.7 - 10.5 mg/dL 7.7 (L)       Imaging and other studies:    No new    Assessment:  1.  Crohn's disease s/p colectomy with end ileostomy with pelvic abscess          Plan:    1.  Follow up Tb testing  2.  Low residue diet  3.  Discussed with Dr. Barajas clear to discharge with [place] [facility] [place].  Would complete 2 weeks of oral antibiotics.

## 2019-04-24 NOTE — DISCHARGE SUMMARY
[facility] Hospital Medicine  Discharge Summary      Patient Name: Rani Carvajal  MRN: [id number]  Admission Date: 4/20/2019  Hospital Length of Stay: 4 days  Discharge Date and Time:  04/24/2019 9:54 AM  Attending Physician: Federica Barajas MD   Discharging Provider: Federica Barajas MD  Primary Care Provider: Shanita Correia MD      HPI:   Ms. Carvajal is a 48 year old woman with Crohn's disease who presented with one week of abdominal pain associated with subjective fever, nausea, vomiting and headache.  She slept all day on the day before this admission.  She had been seen in the ED 2 days before that, and a CT showed a possible abscess.  Admission was recommended but patient signed out AMA in order to take care of her cat.  Patient has an ileostomy and her stools have been normal.  Workup in the ED on 4/18 included a CT that showed a possible small abscess or cyst in the pelvis with adjacent inflammatory changes.  A follow up pelvic ultrasound was unrevealing.  Vitals were normal except for pulse 117.  Labs showed mild hypokalemia.  She was admitted for treatment of possible abdominal abscess.          Hospital Course:   After admission, GI and general surgery were consulted. She continued with broad spectrum antibiotics IV. MRI demonstrated a RLQ fluid collection with enhancing rim, approx. [id number] in size. Given her history of multiple fistulas, IR was consulted for potential percutaneous drainage but found no suitable approach as it was too deep in the pelvis.  She had a good improvement with antibiotics and was able to tolerate a regular diet prior to discharge.  GI recommended follow up with [facility] gastroenterology due to complicated Crohn's disease, as patient has not been seen by a gastroenterologist since she moved here 4 years ago.  She will complete her 2 week course of antibiotics at home.     Consults:   Consults (From admission, onward)        Status Ordering Provider      Inpatient consult to Gastroenterology  Once     Provider:  Renetta Stein MD    Completed ANGELLA HASSAN     Inpatient consult to General Surgery  Once     Provider:  Dirk Hoff Jr., MD    Acknowledged ANGELLA HASSAN     Inpatient consult to Interventional Radiology  Once     Provider:  (Not yet assigned)    DIRK Hardin JR            Final Active Diagnoses:    Diagnosis Date Noted POA    PRINCIPAL PROBLEM:  Sepsis [A41.9] 04/20/2019 Yes    Crohn's disease of large intestine with abscess [K50.114] 04/21/2019 Yes    Abscess of abdominal cavity [K65.1]  Unknown    Fibromyalgia [M79.7] 04/22/2019 Yes     Chronic    Debility [R53.81] 04/22/2019 Yes    Abnormal findings on diagnostic imaging of abdomen [R93.5] 04/21/2019 Yes    Asymptomatic bacteriuria [R82.71] 04/21/2019 Yes      Problems Resolved During this Admission:       Discharged Condition: stable    Disposition: Home or Self Care    Follow Up:  Follow-up Information     Angella Correia MD.    Specialty:  Family Medicine  Why:  Follow up in 1-2 weeks  Contact information:  [address]  [facility] [address]  [phone]             Teressa Casey MD.    Specialty:  Gastroenterology  Why:  Follow up for the next available appointment in Gastroenterology  Contact information:  [address] [facility] [id number] [phone]                 Patient Instructions:      Diet Adult Regular     Activity as tolerated       Medications:  Reconciled Home Medications:      Medication List      START taking these medications    ciprofloxacin HCl 500 MG tablet  Commonly known as:  CIPRO  Take 1 tablet (500 mg total) by mouth every 12 (twelve) hours. for 10 days     metroNIDAZOLE 500 MG tablet  Commonly known as:  FLAGYL  Take 1 tablet (500 mg total) by mouth every 8 (eight) hours. for 10 days        CONTINUE taking these medications    DULoxetine 60 MG capsule  Commonly known as:  CYMBALTA  Take 60 mg by mouth 2 (two)  times daily.     folic acid 1 MG tablet  Commonly known as:  FOLVITE  Take 1 mg by mouth once daily.     * oxycodone 20 mg/ml oral conc 20 mg/mL concentrated solution  Commonly known as:  ROXICODONE INTENSOL  Take 20 mg by mouth 4 (four) times daily as needed for Pain.     * oxyCODONE 30 MG Tab  Commonly known as:  ROXICODONE  Take 30 mg by mouth every 6 (six) hours as needed.     pantoprazole 40 MG tablet  Commonly known as:  PROTONIX  Take 40 mg by mouth once daily.     QUEtiapine 100 MG Tab  Commonly known as:  SEROQUEL  Take 100 mg by mouth.         * This list has 2 medication(s) that are the same as other medications prescribed for you. Read the directions carefully, and ask your doctor or other care provider to review them with you.                Indwelling Lines/Drains at time of discharge:   Lines/Drains/Airways     Drain                 Ileostomy LLQ -- days                Time spent on the discharge of patient: <30 minutes  Patient was seen and examined on the date of discharge and determined to be suitable for discharge.         Federica Francis MD  Department of Hospital Medicine  [facility]

## 2019-04-24 NOTE — NURSING
Patient discharge instructions reviewed verbalized understanding.IV removed ,all belonging with patient.

## 2019-04-24 NOTE — PLAN OF CARE
Problem: Adult Inpatient Plan of Care  Goal: Plan of Care Review  Outcome: Ongoing (interventions implemented as appropriate)  Patient appears to be sleeping in between nursing care, ambulates freely to the restroom. Patient independently does her ileostomy stoma site care. Advised to call nurse for any needs or assistance. IV fluids maintained at ordered rate and IV abx given. Complains of pain medicated with PRN pain medicine. Call light within reach. Bed locked.

## 2019-04-24 NOTE — NURSING
Patient educated on discharge instructions and verbalized understanding.  All questions answered to patient's satisfaction.  IV removed without complication.  Patient to walk off unit.

## 2019-04-26 ENCOUNTER — NURSE TRIAGE (OUTPATIENT)
Dept: ADMINISTRATIVE | Facility: CLINIC | Age: 49
End: 2019-04-26

## 2019-04-26 ENCOUNTER — PATIENT OUTREACH (OUTPATIENT)
Dept: ADMINISTRATIVE | Facility: CLINIC | Age: 49
End: 2019-04-26

## 2019-04-26 LAB
BACTERIA BLD CULT: NORMAL
BACTERIA BLD CULT: NORMAL

## 2019-04-26 NOTE — PATIENT INSTRUCTIONS
Sepsis     To treat sepsis, antibiotics and fluids may by given through an intravenous (IV) line.     Sepsis happens when your body responds with widespread inflammation to a bad infection or bacteremia--the presence of bacteria in your bloodstream. Sepsis can be deadly. Blood pressure may drop and the lungs and kidneys may start to fail. Emergency care for sepsis is crucial.  Risk factors  Those most at risk for sepsis are:  · Infants or older adults  · People who have an illness that weakens their immune system, such as cancer, AIDS, or diabetes  · People being treated with chemotherapy medicines or radiation, which weakens the immune system  · People who have had a transplant  · People with a very severe infection such as pneumonia, meningitis, or a urinary tract infection  When to go to the emergency department (ED)  Sepsis is an emergency. Go to the nearest ED if you have a fever with any of these symptoms:  · Chills and shaking  · Rapid heartbeat and breathing  · Trouble breathing  · Severe nausea or uncontrolled vomiting  · Confusion, disorientation, drowsiness, or dizziness  · Decreased urination  · Severe pain, including in the back or joints   What to expect in the ED  · Blood and urine tests are done to look for bacteria. They also check for organ failure.  · Blood, urine, or sputum cultures may be taken. The samples are sent to a lab. They are placed in a special container. Any bacteria should grow in 24 hours.  · X-rays or other imaging tests may be done.  A person with sepsis will be admitted to the hospital and treated with antibiotics. Treatment may also include oxygen and intravenous fluids.  Date Last Reviewed: 10/1/2016  © 0649-3341 [employer]. [address] [place] [address], [place] [address]. All rights reserved. This information is not intended as a substitute for professional medical care. Always follow your healthcare professional's instructions.

## 2019-04-26 NOTE — PHYSICIAN QUERY
"PT Name: Rani Carvajal  MR #: [id number]     Physician Query Form - Documentation Clarification      CDS: Shanita Saavedra RN, CCDS         Contact information :[phone]  [email or web address]       This form is a permanent document in the medical record.     Query Date: April 26, 2019    By submitting this query, we are merely seeking further clarification of documentation. Please utilize your independent clinical judgment when addressing the question(s) below.    The Medical record reflects the following:    Supporting Clinical Findings Location in Medical Record     "Crohn's disease of large intestine  - imaging is concerning for associated pelvic abscess vs. Complex Cyst       "Unchanged size and appearance of likely abscess in the right lower quadrant, apposing multiple adjacent loops of small bowel along its anterior/superior margin"    "Crohn's disease, status post total abdominal colectomy with ileostomy, rectum and anus intact. Possible abscess or fluid collection adjacent to the rectal stump.  No immediately compelling surgical issue."    "Crohn's disease of large intestine with abscess"  "Abscess of abdominal cavity"   H&P 4/21/19          Surgery consult 4/21/19          Surgery consult 4/21/19            Discharge summary                                                                                  Doctor, Please specify diagnosis or diagnoses associated with above clinical findings.  Please clarify Abscess of abdominal cavity diagnosis.    Provider Use Only      [   ] Abscess of abdominal cavity adjacent to the intestine.    [   ] Abscess of abdominal cavity involving the intestine.    [   ] Other clarification, __________                                                                                                               [ x ] Clinically Undetermined         Sorry, we're reading the same report and your guess is as good as mine.  It's not inside the colon, but what is the " "difference between "adjacent to" and "involving"?    "

## 2019-04-26 NOTE — TELEPHONE ENCOUNTER
Reason for Disposition   SEVERE arm swelling (e.g., all of arm looks swollen)    Protocols used: ARM SWELLING AND EDEMA-A-AH    Rani called to say both her arms are swollen from fingers to shoulders, skin is very tight and uncomfortable, and she is having pain in her chest. Multiple recent hospitalizations, ED and in-patient. Per [facility] triage protocol, recommend ED now for evaluation.  She states she will get on the bus and go now to (lives very close, per patient.)  Advised immediate medical care recommended.  She states understanding.  Message to Shanita Correia MD, whom she states during this call is her pcp.  Please contact caller directly with any additional care advice.

## 2019-05-01 ENCOUNTER — TELEPHONE (OUTPATIENT)
Dept: GASTROENTEROLOGY | Facility: CLINIC | Age: 49
End: 2019-05-01

## 2019-05-01 NOTE — TELEPHONE ENCOUNTER
Called and spoke w/ pt.  Mentioned to pt Dr. Casey spoke w/ Dr Fall, and he will see pt after recent hospitalization, help gets records, and then re-refer pt back to Dr. Casey.  Pt expressed understanding.   Tried to give pt Dr. Fall's contact info, pt said she is in too much pain to get up to get paper and pen.

## 2019-05-21 ENCOUNTER — HOSPITAL ENCOUNTER (OUTPATIENT)
Facility: OTHER | Age: 49
Discharge: HOME | End: 2019-05-23
Attending: EMERGENCY MEDICINE | Admitting: EMERGENCY MEDICINE
Payer: MEDICARE

## 2019-05-21 DIAGNOSIS — R10.9 ABDOMINAL PAIN: ICD-10-CM

## 2019-05-21 DIAGNOSIS — R10.9 ABDOMINAL PAIN, UNSPECIFIED ABDOMINAL LOCATION: Primary | ICD-10-CM

## 2019-05-21 DIAGNOSIS — K50.114 CROHN'S DISEASE OF LARGE INTESTINE WITH ABSCESS: ICD-10-CM

## 2019-05-21 DIAGNOSIS — R93.5 ABNORMAL CT OF THE ABDOMEN: ICD-10-CM

## 2019-05-21 LAB
ALBUMIN SERPL BCP-MCNC: 3.9 G/DL (ref 3.5–5.2)
ALP SERPL-CCNC: 87 U/L (ref 55–135)
ALT SERPL W/O P-5'-P-CCNC: 10 U/L (ref 10–44)
ANION GAP SERPL CALC-SCNC: 12 MMOL/L (ref 8–16)
AST SERPL-CCNC: 15 U/L (ref 10–40)
BASOPHILS # BLD AUTO: 0.09 K/UL (ref 0–0.2)
BASOPHILS NFR BLD: 0.7 % (ref 0–1.9)
BILIRUB SERPL-MCNC: 0.3 MG/DL (ref 0.1–1)
BUN SERPL-MCNC: 7 MG/DL (ref 6–20)
CALCIUM SERPL-MCNC: 9.5 MG/DL (ref 8.7–10.5)
CHLORIDE SERPL-SCNC: 106 MMOL/L (ref 95–110)
CO2 SERPL-SCNC: 23 MMOL/L (ref 23–29)
CREAT SERPL-MCNC: 0.9 MG/DL (ref 0.5–1.4)
DIFFERENTIAL METHOD: ABNORMAL
EOSINOPHIL # BLD AUTO: 0.1 K/UL (ref 0–0.5)
EOSINOPHIL NFR BLD: 0.8 % (ref 0–8)
ERYTHROCYTE [DISTWIDTH] IN BLOOD BY AUTOMATED COUNT: 14.9 % (ref 11.5–14.5)
EST. GFR  (AFRICAN AMERICAN): >60 ML/MIN/1.73 M^2
EST. GFR  (NON AFRICAN AMERICAN): >60 ML/MIN/1.73 M^2
GLUCOSE SERPL-MCNC: 104 MG/DL (ref 70–110)
HCT VFR BLD AUTO: 44.2 % (ref 37–48.5)
HGB BLD-MCNC: 14.8 G/DL (ref 12–16)
LACTATE SERPL-SCNC: 1 MMOL/L (ref 0.5–2.2)
LIPASE SERPL-CCNC: 43 U/L (ref 4–60)
LYMPHOCYTES # BLD AUTO: 2.7 K/UL (ref 1–4.8)
LYMPHOCYTES NFR BLD: 20.4 % (ref 18–48)
MCH RBC QN AUTO: 32.8 PG (ref 27–31)
MCHC RBC AUTO-ENTMCNC: 33.5 G/DL (ref 32–36)
MCV RBC AUTO: 98 FL (ref 82–98)
MONOCYTES # BLD AUTO: 0.8 K/UL (ref 0.3–1)
MONOCYTES NFR BLD: 6 % (ref 4–15)
NEUTROPHILS # BLD AUTO: 9.5 K/UL (ref 1.8–7.7)
NEUTROPHILS NFR BLD: 71.9 % (ref 38–73)
PLATELET # BLD AUTO: 345 K/UL (ref 150–350)
PMV BLD AUTO: 10.1 FL (ref 9.2–12.9)
POTASSIUM SERPL-SCNC: 3.6 MMOL/L (ref 3.5–5.1)
PROT SERPL-MCNC: 8 G/DL (ref 6–8.4)
RBC # BLD AUTO: 4.51 M/UL (ref 4–5.4)
SODIUM SERPL-SCNC: 141 MMOL/L (ref 136–145)
WBC # BLD AUTO: 13.17 K/UL (ref 3.9–12.7)

## 2019-05-21 PROCEDURE — 83605 ASSAY OF LACTIC ACID: CPT

## 2019-05-21 PROCEDURE — 25500020 PHARM REV CODE 255: Performed by: EMERGENCY MEDICINE

## 2019-05-21 PROCEDURE — 99284 EMERGENCY DEPT VISIT MOD MDM: CPT | Mod: 25

## 2019-05-21 PROCEDURE — 96374 THER/PROPH/DIAG INJ IV PUSH: CPT

## 2019-05-21 PROCEDURE — 63600175 PHARM REV CODE 636 W HCPCS: Performed by: EMERGENCY MEDICINE

## 2019-05-21 PROCEDURE — 85025 COMPLETE CBC W/AUTO DIFF WBC: CPT

## 2019-05-21 PROCEDURE — 96375 TX/PRO/DX INJ NEW DRUG ADDON: CPT

## 2019-05-21 PROCEDURE — 80053 COMPREHEN METABOLIC PANEL: CPT

## 2019-05-21 PROCEDURE — 83690 ASSAY OF LIPASE: CPT

## 2019-05-21 PROCEDURE — 36415 COLL VENOUS BLD VENIPUNCTURE: CPT

## 2019-05-21 PROCEDURE — 36410 VNPNXR 3YR/> PHY/QHP DX/THER: CPT

## 2019-05-21 RX ORDER — MORPHINE SULFATE 2 MG/ML
2 INJECTION, SOLUTION INTRAMUSCULAR; INTRAVENOUS
Status: COMPLETED | OUTPATIENT
Start: 2019-05-21 | End: 2019-05-21

## 2019-05-21 RX ADMIN — MORPHINE SULFATE 2 MG: 2 INJECTION, SOLUTION INTRAMUSCULAR; INTRAVENOUS at 09:05

## 2019-05-21 RX ADMIN — IOHEXOL 25 ML: 350 INJECTION, SOLUTION INTRAVENOUS at 11:05

## 2019-05-22 PROBLEM — R10.9 ABDOMINAL PAIN: Status: ACTIVE | Noted: 2019-05-22

## 2019-05-22 PROBLEM — E44.0 MODERATE MALNUTRITION: Status: ACTIVE | Noted: 2019-05-22

## 2019-05-22 LAB
ALBUMIN SERPL BCP-MCNC: 3.6 G/DL (ref 3.5–5.2)
ALP SERPL-CCNC: 76 U/L (ref 55–135)
ALT SERPL W/O P-5'-P-CCNC: 9 U/L (ref 10–44)
ANION GAP SERPL CALC-SCNC: 8 MMOL/L (ref 8–16)
AST SERPL-CCNC: 12 U/L (ref 10–40)
BACTERIA #/AREA URNS HPF: ABNORMAL /HPF
BASOPHILS # BLD AUTO: 0.09 K/UL (ref 0–0.2)
BASOPHILS NFR BLD: 0.9 % (ref 0–1.9)
BILIRUB SERPL-MCNC: 0.4 MG/DL (ref 0.1–1)
BILIRUB UR QL STRIP: NEGATIVE
BUN SERPL-MCNC: 7 MG/DL (ref 6–20)
CALCIUM SERPL-MCNC: 9 MG/DL (ref 8.7–10.5)
CHLORIDE SERPL-SCNC: 105 MMOL/L (ref 95–110)
CHOLEST SERPL-MCNC: 204 MG/DL (ref 120–199)
CHOLEST/HDLC SERPL: 5.1 {RATIO} (ref 2–5)
CLARITY UR: ABNORMAL
CO2 SERPL-SCNC: 21 MMOL/L (ref 23–29)
COLOR UR: YELLOW
CREAT SERPL-MCNC: 0.9 MG/DL (ref 0.5–1.4)
CRP SERPL-MCNC: 1.03 MG/L (ref 0–3.19)
DIFFERENTIAL METHOD: ABNORMAL
EOSINOPHIL # BLD AUTO: 0.3 K/UL (ref 0–0.5)
EOSINOPHIL NFR BLD: 2.5 % (ref 0–8)
ERYTHROCYTE [DISTWIDTH] IN BLOOD BY AUTOMATED COUNT: 15 % (ref 11.5–14.5)
ERYTHROCYTE [SEDIMENTATION RATE] IN BLOOD: 20 MM/HR (ref 0–20)
EST. GFR  (AFRICAN AMERICAN): >60 ML/MIN/1.73 M^2
EST. GFR  (NON AFRICAN AMERICAN): >60 ML/MIN/1.73 M^2
GLUCOSE SERPL-MCNC: 92 MG/DL (ref 70–110)
GLUCOSE UR QL STRIP: NEGATIVE
HCT VFR BLD AUTO: 39.7 % (ref 37–48.5)
HDLC SERPL-MCNC: 40 MG/DL (ref 40–75)
HDLC SERPL: 19.6 % (ref 20–50)
HGB BLD-MCNC: 13.2 G/DL (ref 12–16)
HGB UR QL STRIP: ABNORMAL
KETONES UR QL STRIP: NEGATIVE
LDLC SERPL CALC-MCNC: 132.4 MG/DL (ref 63–159)
LEUKOCYTE ESTERASE UR QL STRIP: ABNORMAL
LYMPHOCYTES # BLD AUTO: 2.5 K/UL (ref 1–4.8)
LYMPHOCYTES NFR BLD: 24.9 % (ref 18–48)
MAGNESIUM SERPL-MCNC: 2.1 MG/DL (ref 1.6–2.6)
MCH RBC QN AUTO: 32.6 PG (ref 27–31)
MCHC RBC AUTO-ENTMCNC: 33.2 G/DL (ref 32–36)
MCV RBC AUTO: 98 FL (ref 82–98)
MICROSCOPIC COMMENT: ABNORMAL
MONOCYTES # BLD AUTO: 0.7 K/UL (ref 0.3–1)
MONOCYTES NFR BLD: 7.4 % (ref 4–15)
NEUTROPHILS # BLD AUTO: 6.4 K/UL (ref 1.8–7.7)
NEUTROPHILS NFR BLD: 64.1 % (ref 38–73)
NITRITE UR QL STRIP: NEGATIVE
NONHDLC SERPL-MCNC: 164 MG/DL
OTHER ELEMENTS URNS MICRO: ABNORMAL
PH UR STRIP: 6 [PH] (ref 5–8)
PHOSPHATE SERPL-MCNC: 3.3 MG/DL (ref 2.7–4.5)
PLATELET # BLD AUTO: 328 K/UL (ref 150–350)
PMV BLD AUTO: 10.1 FL (ref 9.2–12.9)
POTASSIUM SERPL-SCNC: 3.2 MMOL/L (ref 3.5–5.1)
PROCALCITONIN SERPL IA-MCNC: 0.03 NG/ML
PROT SERPL-MCNC: 7.2 G/DL (ref 6–8.4)
PROT UR QL STRIP: NEGATIVE
RBC # BLD AUTO: 4.05 M/UL (ref 4–5.4)
RBC #/AREA URNS HPF: 5 /HPF (ref 0–4)
SODIUM SERPL-SCNC: 134 MMOL/L (ref 136–145)
SP GR UR STRIP: 1.01 (ref 1–1.03)
SQUAMOUS #/AREA URNS HPF: 9 /HPF
TRIGL SERPL-MCNC: 158 MG/DL (ref 30–150)
URN SPEC COLLECT METH UR: ABNORMAL
UROBILINOGEN UR STRIP-ACNC: NEGATIVE EU/DL
WBC # BLD AUTO: 10.02 K/UL (ref 3.9–12.7)
WBC #/AREA URNS HPF: 8 /HPF (ref 0–5)

## 2019-05-22 PROCEDURE — 25000003 PHARM REV CODE 250: Performed by: NURSE PRACTITIONER

## 2019-05-22 PROCEDURE — 36415 COLL VENOUS BLD VENIPUNCTURE: CPT

## 2019-05-22 PROCEDURE — 81000 URINALYSIS NONAUTO W/SCOPE: CPT

## 2019-05-22 PROCEDURE — 84145 PROCALCITONIN (PCT): CPT

## 2019-05-22 PROCEDURE — 85651 RBC SED RATE NONAUTOMATED: CPT

## 2019-05-22 PROCEDURE — 99220 PR INITIAL OBSERVATION CARE,LEVL III: ICD-10-PCS | Mod: ,,, | Performed by: NURSE PRACTITIONER

## 2019-05-22 PROCEDURE — 97802 MEDICAL NUTRITION INDIV IN: CPT

## 2019-05-22 PROCEDURE — 99220 PR INITIAL OBSERVATION CARE,LEVL III: CPT | Mod: ,,, | Performed by: NURSE PRACTITIONER

## 2019-05-22 PROCEDURE — 80061 LIPID PANEL: CPT

## 2019-05-22 PROCEDURE — 86141 C-REACTIVE PROTEIN HS: CPT

## 2019-05-22 PROCEDURE — 63600175 PHARM REV CODE 636 W HCPCS: Performed by: NURSE PRACTITIONER

## 2019-05-22 PROCEDURE — 94761 N-INVAS EAR/PLS OXIMETRY MLT: CPT

## 2019-05-22 PROCEDURE — 63600175 PHARM REV CODE 636 W HCPCS: Performed by: EMERGENCY MEDICINE

## 2019-05-22 PROCEDURE — 80053 COMPREHEN METABOLIC PANEL: CPT

## 2019-05-22 PROCEDURE — 83735 ASSAY OF MAGNESIUM: CPT

## 2019-05-22 PROCEDURE — 85025 COMPLETE CBC W/AUTO DIFF WBC: CPT

## 2019-05-22 PROCEDURE — 84100 ASSAY OF PHOSPHORUS: CPT

## 2019-05-22 PROCEDURE — 76937 US GUIDE VASCULAR ACCESS: CPT

## 2019-05-22 PROCEDURE — 63600175 PHARM REV CODE 636 W HCPCS: Performed by: HOSPITALIST

## 2019-05-22 PROCEDURE — G0378 HOSPITAL OBSERVATION PER HR: HCPCS

## 2019-05-22 RX ORDER — SODIUM CHLORIDE 0.9 % (FLUSH) 0.9 %
10 SYRINGE (ML) INJECTION
Status: DISCONTINUED | OUTPATIENT
Start: 2019-05-22 | End: 2019-05-23 | Stop reason: HOSPADM

## 2019-05-22 RX ORDER — SODIUM CHLORIDE 9 MG/ML
INJECTION, SOLUTION INTRAVENOUS CONTINUOUS
Status: DISCONTINUED | OUTPATIENT
Start: 2019-05-22 | End: 2019-05-23 | Stop reason: HOSPADM

## 2019-05-22 RX ORDER — KETOROLAC TROMETHAMINE 30 MG/ML
10 INJECTION, SOLUTION INTRAMUSCULAR; INTRAVENOUS
Status: COMPLETED | OUTPATIENT
Start: 2019-05-22 | End: 2019-05-22

## 2019-05-22 RX ORDER — HEPARIN SODIUM 5000 [USP'U]/ML
5000 INJECTION, SOLUTION INTRAVENOUS; SUBCUTANEOUS EVERY 8 HOURS
Status: DISCONTINUED | OUTPATIENT
Start: 2019-05-22 | End: 2019-05-23 | Stop reason: HOSPADM

## 2019-05-22 RX ORDER — ONDANSETRON 8 MG/1
8 TABLET, ORALLY DISINTEGRATING ORAL EVERY 8 HOURS PRN
Status: DISCONTINUED | OUTPATIENT
Start: 2019-05-22 | End: 2019-05-23 | Stop reason: HOSPADM

## 2019-05-22 RX ORDER — MORPHINE SULFATE 2 MG/ML
2 INJECTION, SOLUTION INTRAMUSCULAR; INTRAVENOUS EVERY 4 HOURS PRN
Status: DISCONTINUED | OUTPATIENT
Start: 2019-05-22 | End: 2019-05-23

## 2019-05-22 RX ORDER — ACETAMINOPHEN 325 MG/1
650 TABLET ORAL EVERY 4 HOURS PRN
Status: DISCONTINUED | OUTPATIENT
Start: 2019-05-22 | End: 2019-05-23 | Stop reason: HOSPADM

## 2019-05-22 RX ORDER — POLYETHYLENE GLYCOL 3350, SODIUM SULFATE ANHYDROUS, SODIUM BICARBONATE, SODIUM CHLORIDE, POTASSIUM CHLORIDE 236; 22.74; 6.74; 5.86; 2.97 G/4L; G/4L; G/4L; G/4L; G/4L
2 POWDER, FOR SOLUTION ORAL ONCE
Status: COMPLETED | OUTPATIENT
Start: 2019-05-23 | End: 2019-05-23

## 2019-05-22 RX ORDER — POTASSIUM CHLORIDE 7.45 MG/ML
10 INJECTION INTRAVENOUS
Status: ACTIVE | OUTPATIENT
Start: 2019-05-22 | End: 2019-05-22

## 2019-05-22 RX ORDER — MORPHINE SULFATE 2 MG/ML
2 INJECTION, SOLUTION INTRAMUSCULAR; INTRAVENOUS
Status: DISCONTINUED | OUTPATIENT
Start: 2019-05-22 | End: 2019-05-22

## 2019-05-22 RX ADMIN — POTASSIUM CHLORIDE 10 MEQ: 10 INJECTION, SOLUTION INTRAVENOUS at 11:05

## 2019-05-22 RX ADMIN — KETOROLAC TROMETHAMINE 10 MG: 30 INJECTION, SOLUTION INTRAMUSCULAR at 01:05

## 2019-05-22 RX ADMIN — HEPARIN SODIUM 5000 UNITS: 5000 INJECTION, SOLUTION INTRAVENOUS; SUBCUTANEOUS at 07:05

## 2019-05-22 RX ADMIN — MORPHINE SULFATE 2 MG: 2 INJECTION, SOLUTION INTRAMUSCULAR; INTRAVENOUS at 09:05

## 2019-05-22 RX ADMIN — HEPARIN SODIUM 5000 UNITS: 5000 INJECTION, SOLUTION INTRAVENOUS; SUBCUTANEOUS at 02:05

## 2019-05-22 RX ADMIN — POTASSIUM CHLORIDE 10 MEQ: 10 INJECTION, SOLUTION INTRAVENOUS at 09:05

## 2019-05-22 RX ADMIN — SODIUM CHLORIDE: 0.9 INJECTION, SOLUTION INTRAVENOUS at 04:05

## 2019-05-22 RX ADMIN — MORPHINE SULFATE 2 MG: 2 INJECTION, SOLUTION INTRAMUSCULAR; INTRAVENOUS at 06:05

## 2019-05-22 RX ADMIN — MORPHINE SULFATE 2 MG: 2 INJECTION, SOLUTION INTRAMUSCULAR; INTRAVENOUS at 02:05

## 2019-05-22 RX ADMIN — MORPHINE SULFATE 2 MG: 2 INJECTION, SOLUTION INTRAMUSCULAR; INTRAVENOUS at 10:05

## 2019-05-22 RX ADMIN — MORPHINE SULFATE 2 MG: 2 INJECTION, SOLUTION INTRAMUSCULAR; INTRAVENOUS at 04:05

## 2019-05-22 RX ADMIN — POTASSIUM CHLORIDE 10 MEQ: 10 INJECTION, SOLUTION INTRAVENOUS at 10:05

## 2019-05-22 RX ADMIN — HEPARIN SODIUM 5000 UNITS: 5000 INJECTION, SOLUTION INTRAVENOUS; SUBCUTANEOUS at 10:05

## 2019-05-22 NOTE — NURSING
Patient refusing all medications and fluids through IV access in her arm and her EJ in the left neck.  Explained to patient the importance of fluids, why there were needed and what they do for her. Patient still refused fluids.  Dr Arias notified of patient's decision.

## 2019-05-22 NOTE — ASSESSMENT & PLAN NOTE
Malnutrition in the context of Chronic Illness/Injury    Related to (etiology):  Inadequate energy intake  Signs and Symptoms (as evidenced by):  moderate-severe muscle and fat depletoin (NFPE completed 5/22)  Interventions:  Collaboration with Providers   Nutrition Diagnosis Status:  New

## 2019-05-22 NOTE — H&P
"[facility] Medicine  History & Physical    Patient Name: Rani Carvajal  MRN: [id number]  Admission Date: 5/21/2019  Attending Physician: Gabriel Arias MD   Primary Care Provider: Shanita Correia MD         Patient information was obtained from patient, past medical records and ER records.     Subjective:     Principal Problem:Abdominal pain    Chief Complaint:   Chief Complaint   Patient presents with    Abdominal Pain     Reports mid abdominal pain that started this AM, reports hx of crohns, reports nausea        HPI: The patient is a 49 y.o. female with a history of crohn's disease who presents with complaint of abdominal pain that began this morning. The abdominal pain is currently rated a 9/10. She reports that she is also experiencing fever, chills, and nausea. The patient one month ago was treated for an abscess and states "they went the antibiotic route because surgery could do more harm than good". The patient reports that she was compliant with her antibiotics regimen. She denies fever, sore throat, chest pain, shortness of breath, vomiting, and dysuria. The patient admits to smoking, but denies drinking and illicit drug use.      Past Medical History:   Diagnosis Date    Arthritis     Crohn disease     Fibromyalgia        Past Surgical History:   Procedure Laterality Date    ABDOMINAL SURGERY      COLON SURGERY      ILEOSTOMY         Review of patient's allergies indicates:   Allergen Reactions    Imuran [azathioprine sodium] Other (See Comments)     pancreatitis    Penicillins Hives    Sulfa (sulfonamide antibiotics) Nausea Only and Rash       No current facility-administered medications on file prior to encounter.      Current Outpatient Medications on File Prior to Encounter   Medication Sig    duloxetine (CYMBALTA) 60 MG capsule Take 60 mg by mouth 2 (two) times daily.     folic acid (FOLVITE) 1 MG tablet Take 1 mg by mouth once daily.    " oxycodone (ROXICODONE) 30 MG Tab Take 30 mg by mouth every 6 (six) hours as needed.     oxycodone 20 mg/ml oral conc (ROXICODONE INTENSOL) 20 mg/mL concentrated solution Take 20 mg by mouth 4 (four) times daily as needed for Pain.     pantoprazole (PROTONIX) 40 MG tablet Take 40 mg by mouth once daily.    QUEtiapine (SEROQUEL) 100 MG Tab Take 100 mg by mouth.      Family History     None        Tobacco Use    Smoking status: Current Every Day Smoker     Packs/day: 0.25     Types: Cigarettes    Smokeless tobacco: Current User   Substance and Sexual Activity    Alcohol use: Never     Frequency: Never    Drug use: Never    Sexual activity: Not on file     Review of Systems   Constitutional: Positive for activity change, appetite change and fatigue. Negative for fever.   HENT: Negative for congestion, ear pain, rhinorrhea and sinus pressure.    Eyes: Negative for pain and discharge.   Respiratory: Negative for cough, chest tightness, shortness of breath and wheezing.    Cardiovascular: Negative for chest pain and leg swelling.   Gastrointestinal: Positive for abdominal distention and abdominal pain. Negative for diarrhea, nausea and vomiting.   Endocrine: Negative for cold intolerance and heat intolerance.   Genitourinary: Negative for difficulty urinating, flank pain, frequency, hematuria and urgency.   Musculoskeletal: Positive for arthralgias and myalgias. Negative for joint swelling.   Allergic/Immunologic: Negative for environmental allergies and food allergies.   Neurological: Negative for dizziness, weakness, light-headedness and headaches.   Hematological: Does not bruise/bleed easily.   Psychiatric/Behavioral: Negative for agitation, behavioral problems and decreased concentration.     Objective:     Vital Signs (Most Recent):  Temp: 98 °F (36.7 °C) (05/22/19 0237)  Pulse: 70 (05/22/19 0237)  Resp: 18 (05/22/19 0237)  BP: 103/69 (05/22/19 0237)  SpO2: 98 % (05/22/19 0237) Vital Signs (24h Range):  Temp:   [98 °F (36.7 °C)-99.9 °F (37.7 °C)] 98 °F (36.7 °C)  Pulse:  [] 70  Resp:  [18] 18  SpO2:  [94 %-98 %] 98 %  BP: (103-142)/(66-92) 103/69     Weight: 49.9 kg (110 lb)  Body mass index is 21.48 kg/m².    Physical Exam   Constitutional: She is oriented to person, place, and time. She appears well-developed and well-nourished.   HENT:   Head: Normocephalic.   Eyes: Conjunctivae are normal. Right eye exhibits no discharge. Left eye exhibits no discharge.   Neck: Normal range of motion. Neck supple.   Cardiovascular: Normal rate and normal heart sounds.   Pulmonary/Chest: Effort normal. Tachypnea noted. No respiratory distress. She has decreased breath sounds in the right lower field and the left lower field.   Abdominal: Soft. She exhibits no distension. Bowel sounds are increased. There is tenderness in the left lower quadrant.   illeostomy noted   Musculoskeletal: Normal range of motion.   Neurological: She is alert and oriented to person, place, and time. GCS eye subscore is 4. GCS verbal subscore is 5. GCS motor subscore is 6.   Skin: Skin is warm and dry.   Psychiatric: She has a normal mood and affect. Her speech is normal and behavior is normal.           Significant Labs:   CBC:   Recent Labs   Lab 05/21/19  2153   WBC 13.17*   HGB 14.8   HCT 44.2        CMP:   Recent Labs   Lab 05/21/19  2153      K 3.6      CO2 23      BUN 7   CREATININE 0.9   CALCIUM 9.5   PROT 8.0   ALBUMIN 3.9   BILITOT 0.3   ALKPHOS 87   AST 15   ALT 10   ANIONGAP 12   EGFRNONAA >60       Significant Imaging: I have reviewed all pertinent imaging results/findings within the past 24 hours.    Assessment/Plan:     * Abdominal pain  CT- Postsurgical changes of subtotal colectomy with left lower quadrant ileostomy.  No evidence of bowel obstruction.  Small amount of contrast seen tracking into the right lower abdomen in region of previously visualized abscess which may represent small contained perforation or  sinus tract. Previously visualized abscess in this location has otherwise resolved.  Interval decrease size of small 1.5 cm hypodense collection within left lower abdomen region of previous abscess.  No organized drainable fluid collection or abscess seen within the abdomen at this time.    WBC- 13.17, no tachycardia, no tachypnea, afebrile, lactic- 1    Consult General Surgery  Will hold off on abx since no signs of infection        VTE Risk Mitigation (From admission, onward)        Ordered     heparin (porcine) injection 5,000 Units  Every 8 hours      05/22/19 0234     Place sequential compression device  Until discontinued      05/22/19 0234     IP VTE HIGH RISK PATIENT  Once      05/22/19 0234             Janes Savage NP  Department of Hospital Medicine   [facility]

## 2019-05-22 NOTE — ED NOTES
Received report, pt lying in bed, respirations even, unlabored, eyes open spontaneously, NAD noted. Call bell within reach, pt updated on plan of care, will continue to monitor.

## 2019-05-22 NOTE — ED PROVIDER NOTES
"Encounter Date: 5/21/2019    SCRIBE #1 NOTE: I, Keegan Torres, am scribing for, and in the presence of, Dr. Carranza.       History     Chief Complaint   Patient presents with    Abdominal Pain     Reports mid abdominal pain that started this AM, reports hx of crohns, reports nausea     Time seen by provider: 9:35 PM    This is a 49 y.o. female with a history of crohn's disease who presents with complaint of abdominal pain that began this morning. The abdominal pain is currently rated a 9/10. She reports that she is also experiencing fever, chills, and nausea. The patient one month ago was treated for an abscess and states "they went the antibiotic route because surgery could do more harm than good". The patient reports that she was compliant with her antibiotics regimen. She denies fever, sore throat, chest pain, shortness of breath, vomiting, and dysuria. The patient admits to smoking, but denies drinking and illicit drug use.     The history is provided by the patient.     Review of patient's allergies indicates:   Allergen Reactions    Imuran [azathioprine sodium] Other (See Comments)     pancreatitis    Penicillins Hives    Sulfa (sulfonamide antibiotics) Nausea Only and Rash     Past Medical History:   Diagnosis Date    Arthritis     Crohn disease     Depression     Fibromyalgia     Hx of psychiatric care      Past Surgical History:   Procedure Laterality Date    ABDOMINAL SURGERY      COLON SURGERY      COLONOSCOPY/ileoscopy through stoma N/A 5/23/2019    Performed by Santiago Sims MD at [facility] ENDO    ILEOSTOMY       History reviewed. No pertinent family history.  Social History     Tobacco Use    Smoking status: Current Every Day Smoker     Packs/day: 0.25     Types: Cigarettes    Smokeless tobacco: Current User   Substance Use Topics    Alcohol use: Never     Frequency: Never    Drug use: Never     Review of Systems   Constitutional: Positive for chills and fever.   HENT: Negative for sore " throat.    Respiratory: Negative for shortness of breath.    Cardiovascular: Negative for chest pain.   Gastrointestinal: Positive for abdominal pain and nausea. Negative for vomiting.   Genitourinary: Negative for dysuria.   Musculoskeletal: Negative for back pain.   Skin: Negative for rash.   Neurological: Negative for weakness.   Hematological: Does not bruise/bleed easily.       Physical Exam     Initial Vitals [05/21/19 1851]   BP Pulse Resp Temp SpO2   (!) 142/92 110 18 99.9 °F (37.7 °C) (!) 94 %      MAP       --         Physical Exam    Nursing note and vitals reviewed.  Constitutional: She appears well-developed and well-nourished. She is not diaphoretic. No distress.   HENT:   Head: Normocephalic and atraumatic.   Mouth/Throat: Oropharynx is clear and moist.   Oropharynx is clear and intact. Moist mucous membranes.    Eyes: Conjunctivae and EOM are normal. Pupils are equal, round, and reactive to light. Right eye exhibits no discharge. Left eye exhibits no discharge.   Conjunctivae are clear, pink, and intact.    Neck: Normal range of motion.   Cardiovascular: Normal rate, regular rhythm and normal heart sounds. Exam reveals no gallop and no friction rub.    No murmur heard.  Pulmonary/Chest: Breath sounds normal. No respiratory distress. She has no wheezes. She has no rhonchi. She has no rales.   Lungs are clear to auscultation bilaterally.    Abdominal: Soft. There is generalized tenderness. There is no rebound and no guarding.   Musculoskeletal: Normal range of motion. She exhibits no edema or tenderness.   Neurological: She is alert and oriented to person, place, and time. GCS eye subscore is 4. GCS verbal subscore is 5. GCS motor subscore is 6.   Skin: Skin is warm and dry. No rash and no abscess noted. No erythema. No pallor.   Psychiatric: She has a normal mood and affect. Her behavior is normal. Judgment and thought content normal.         ED Course   External Jugular IV  Date/Time: 5/21/2019 9:52  PM  Performed by: Elian Taylor MD  Authorized by: Elian Taylor MD   Consent Done: Not Needed  Location (Ext Jugular): Left.  Area Prepped With: Chlorohexidine.  Catheter Size: 20 ga.  Catheter Type: Jelco.  Number of attempts: 1  Fixation/Dressing: Taped in place.  Patient tolerance: Patient tolerated the procedure well with no immediate complications        Labs Reviewed   CBC W/ AUTO DIFFERENTIAL - Abnormal; Notable for the following components:       Result Value    WBC 13.17 (*)     Mean Corpuscular Hemoglobin 32.8 (*)     RDW 14.9 (*)     Gran # (ANC) 9.5 (*)     All other components within normal limits   URINALYSIS, REFLEX TO URINE CULTURE - Abnormal; Notable for the following components:    Appearance, UA Hazy (*)     Occult Blood UA Trace (*)     Leukocytes, UA 2+ (*)     All other components within normal limits    Narrative:     Preferred Collection Type->Urine, Clean Catch   URINALYSIS MICROSCOPIC - Abnormal; Notable for the following components:    RBC, UA 5 (*)     WBC, UA 8 (*)     Other (U/A) Rare (*)     All other components within normal limits    Narrative:     Preferred Collection Type->Urine, Clean Catch   COMPREHENSIVE METABOLIC PANEL   LIPASE   LACTIC ACID, PLASMA    Narrative:     Recoll. [id number] by YARI at 05/21/2019 22:30, reason: Specimen   hemolyzed. Notified to PERLA [facility] @ED          Imaging Results          CT Abdomen Pelvis  Without Contrast (Final result)  Result time 05/21/19 23:51:10    Final result by Fanny Garcia MD (05/21/19 23:51:10)                 Impression:      1. Postsurgical changes of subtotal colectomy with left lower quadrant ileostomy.  No evidence of bowel obstruction.  2. Small amount of contrast seen tracking into the right lower abdomen in region of previously visualized abscess which may represent small contained perforation or sinus tract.  Previously visualized abscess in this location has otherwise resolved.  3. Interval decrease size of  small 1.5 cm hypodense collection within left lower abdomen region of previous abscess.  No organized drainable fluid collection or abscess seen within the abdomen at this time.  4. Additional findings as detailed above.      Electronically signed by: Fanny Garcia MD  Date:    05/21/2019  Time:    23:51             Narrative:    EXAMINATION:  CT ABDOMEN PELVIS WITHOUT CONTRAST    CLINICAL HISTORY:  Abd pain, fever, abscess suspected;    TECHNIQUE:  Low dose axial images, sagittal and coronal reformations were obtained from the lung bases to the pubic symphysis.  30 mL of oral Omnipaque 350 was administered.    COMPARISON:  CT abdomen and pelvis from 04/18/2019.    FINDINGS:  The visualized portion of the heart is unremarkable.  The lung bases are clear.    No significant hepatic abnormality seen on this noncontrast exam.  There is no intra-or extrahepatic biliary ductal dilatation.  The gallbladder is unremarkable.  The stomach, pancreas, spleen, and adrenal glands are unremarkable.    Kidneys show no evidence of stones or hydronephrosis. Ureters are unremarkable along their courses.  Urinary bladder and uterus show no significant abnormalities.    Postsurgical changes subtotal colectomy are seen left lower quadrant ileostomy.  Small parastomal hernia is seen without evidence for obstruction.  Contrast is visualized extending throughout small bowel loops into the ileostomy bag.  Small right anterolateral abdominal wall hernia is seen containing loop of small bowel without evidence for obstruction.  Small amount of contrast is seen tracking into the right lower abdomen region of previous abscess seen on CT from 04/18/2019.  Separate small 1.5 cm hypodense collection is seen within the left lower abdomen which is decreased in size from previous CT.  No free air identified.    Aorta tapers normally.    No acute osseous abnormality identified. Subcutaneous soft tissue structures are unremarkable.                                  Medical Decision Making:   Clinical Tests:   Lab Tests: Ordered and Reviewed  Radiological Study: Ordered and Reviewed            Scribe Attestation:   Scribe #1: I performed the above scribed service and the documentation accurately describes the services I performed. I attest to the accuracy of the note.    Attending Attestation:           Physician Attestation for Scribe:  Physician Attestation Statement for Scribe #1: I, Dr. Taylor, reviewed documentation, as scribed by Keegan Torres  in my presence, and it is both accurate and complete.         Attending ED Notes:   Emergent evaluation a 49-year-old female with past medical history of Crohn's disease presents to the emergency room with chief complaint of abdominal pain.  Patient is afebrile, nontoxic appearing with stable vital signs.  Urinary analysis reveals trace blood and 2+ leukocytes and rare bacteria.  Lactic acid is 1.0.  White blood cell count of 329[address].  H&H 14.8 and 44.2.  No acute findings on CMP.  Lipase is 43.  Patient's disposition and plan of care was transferred to next ED physician shift.      On later evaluation, CT scan revealed postsurgical changes of subtotal colectomy.  Patient found to have small amount of contrast seen tracking into the right lower abdomen a previously visualized abscess which may represent small contained perforation or sinus tract.  Patient also found to have interval decreased size of small 1.5 cm hypodense collection within the left lower abdomen region of previous abscess.  No organized drainable fluid collection at this time.    The patient was extensively counseled her diagnosis and treatment including all diagnostic, laboratory and physical exam findings.  The patient was admitted in stable condition.             Clinical Impression:     1. Abdominal pain, unspecified abdominal location    2. Abnormal CT of the abdomen    3. Abdominal pain    4. Crohn's disease of large intestine with abscess                                  Elian Taylor MD  05/27/19 1045

## 2019-05-22 NOTE — ASSESSMENT & PLAN NOTE
CT- Postsurgical changes of subtotal colectomy with left lower quadrant ileostomy.  No evidence of bowel obstruction.  Small amount of contrast seen tracking into the right lower abdomen in region of previously visualized abscess which may represent small contained perforation or sinus tract. Previously visualized abscess in this location has otherwise resolved.  Interval decrease size of small 1.5 cm hypodense collection within left lower abdomen region of previous abscess.  No organized drainable fluid collection or abscess seen within the abdomen at this time.    WBC- 13.17, no tachycardia, no tachypnea, afebrile, lactic- 1    Consult General Surgery  Will hold off on abx since no signs of infection

## 2019-05-22 NOTE — HPI
"The patient is a 49 y.o. female with a history of crohn's disease who presents with complaint of abdominal pain that began this morning. The abdominal pain is currently rated a 9/10. She reports that she is also experiencing fever, chills, and nausea. The patient one month ago was treated for an abscess and states "they went the antibiotic route because surgery could do more harm than good". The patient reports that she was compliant with her antibiotics regimen. She denies fever, sore throat, chest pain, shortness of breath, vomiting, and dysuria. The patient admits to smoking, but denies drinking and illicit drug use.   "

## 2019-05-22 NOTE — PROVIDER PROGRESS NOTES - EMERGENCY DEPT.
Encounter Date: 5/21/2019    ED Physician Progress Notes        Physician Note:   2:33 AM  I assumed care of this patient from Dr. Phan while awaiting the results of her urinalysis and CT of the abdomen and pelvis with oral contrast only.  Urinalysis showed leukocytes and a 8 white blood cells, however, the patient had no urinary symptoms.  Therefore, the decision was being noted to treat with antibiotics after discussion with the moonlighter.  CT of the abdomen and pelvis showed resolution of abscess previously seen but there was trace amount of extravasated contrast in the right lower quadrant.  I did discuss these findings with Dr. Rao who recommended the patient be admitted for observation with plan to have Dr. Hoff evaluate the patient tomorrow as well as Gastroenterology.  The patient was admitted to the hospitalist service in stable condition.

## 2019-05-22 NOTE — PROGRESS NOTES
[facility]  Adult Nutrition  Progress Note    SUMMARY       Recommendations    Recommendation: When medically feasible, advance diet as tolerated to regular with Boost Plus (strawberry) tid    Goals:   1. Meet >85% EEN and EPN daily   2. Prevent wt loss of >2% per week   3. Promote nutrition related labs wnl    Nutrition Goal Status: new  Communication of MAHENDRA Recs: discussed on rounds    Reason for Assessment    Reason For Assessment: identified at risk by screening criteria(MST)  Diagnosis: gastrointestinal disease(abdominal pain)  Relevant Medical History: Arthritis, Crohn's disease (h/o multiple fistulas), Fibromyalgia  Interdisciplinary Rounds: attended  General Information Comments: Pt endorses poor PO intake PTA due to stress about her living situation. No significant wt hx in chart. Stated UBW 140lbs. Pt with h/o crohn's disease, had subtotal colectomy with ileostomy. Pt was NPO at time of visit, was feeling hungry. Diet just advanced to clears. Nutrition focused physical exam performed, pt has moderate-severe muscle and fat depletion c/w chronic moderate malnutrition.    Nutrition Discharge Planning: pending medical course    Nutrition Risk Screen    Nutrition Risk Screen: no indicators present    Nutrition/Diet History    Patient Reported Diet/Restrictions/Preferences: general  Spiritual, Cultural Beliefs, Presybeterian Practices, Values that Affect Care: yes    Anthropometrics    Temp: 98.1 °F (36.7 °C)  Height Method: Stated  Height: 5' (152.4 cm)  Height (inches): 60 in  Weight Method: Bed Scale  Weight: 49.9 kg (110 lb 0.2 oz)  Weight (lb): 110.01 lb  Ideal Body Weight (IBW), Female: 100 lb  % Ideal Body Weight, Female (lb): 110.01 lb  BMI (Calculated): 21.5  BMI Grade: 18.5-24.9 - normal  Usual Body Weight (UBW), k.6 kg  % Usual Body Weight: 78.62  % Weight Change From Usual Weight: -21.54 %     Lab/Procedures/Meds    Pertinent Labs: Reviewed  Lab Results   Component Value Date      (L) 05/22/2019    K 3.2 (L) 05/22/2019    CO2 21 (L) 05/22/2019     Pertinent Meds: Reviewed  Scheduled Meds:   heparin (porcine)  5,000 Units Subcutaneous Q8H     Continuous Infusions:   sodium chloride 0.9% 125 mL/hr at 05/22/19 0401     Estimated/Assessed Needs    Weight Used For Calorie Calculations: 49.9 kg (110 lb 0.2 oz)  Energy Calorie Requirements (kcal): 6959-9787  Energy Need Method: Kcal/kg(30-35 kcal/kg)  Protein Requirements: 60-70 gm/d (1.2-1.4 gm/kg)  Weight Used For Protein Calculations: 49.9 kg (110 lb 0.2 oz)  Fluid Requirements (mL): 1500(or per team)  Estimated Fluid Requirement Method: RDA Method    Nutrition Prescription Ordered    Current Diet Order: clear liquid    Evaluation of Received Nutrient/Fluid Intake    % Intake of Estimated Energy Needs: 0 - 25 %  % Meal Intake: 0 - 25 %    Nutrition Risk    Level of Risk/Frequency of Follow-up: low     Assessment and Plan    Moderate malnutrition  Malnutrition in the context of Chronic Illness/Injury    Related to (etiology):  Inadequate energy intake  Signs and Symptoms (as evidenced by):  moderate-severe muscle and fat depletoin (NFPE completed 5/22)  Interventions:  Collaboration with Providers   Nutrition Diagnosis Status:  New      Monitor and Evaluation    Food and Nutrient Intake: energy intake, food and beverage intake  Food and Nutrient Adminstration: diet order  Anthropometric Measurements: weight, weight change  Biochemical Data, Medical Tests and Procedures: electrolyte and renal panel, gastrointestinal profile, glucose/endocrine profile, inflammatory profile, lipid profile  Nutrition-Focused Physical Findings: overall appearance, extremities, muscles and bones, skin     Malnutrition Assessment  Malnutrition Type: chronic illness    Orbital Region (Subcutaneous Fat Loss): moderate depletion  Upper Arm Region (Subcutaneous Fat Loss): severe depletion   [place] Region (Muscle Loss): mild depletion  Clavicle Bone Region (Muscle  Loss): moderate depletion  Clavicle and Acromion Bone Region (Muscle Loss): mild depletion  Patellar Region (Muscle Loss): mild depletion  Anterior Thigh Region (Muscle Loss): mild depletion  Posterior Calf Region (Muscle Loss): mild depletion     Nutrition Follow-Up    RD Follow-up?: Yes    Loraine Sue, MS, RD, LDN   Dietitian, [facility]  [phone]

## 2019-05-22 NOTE — ED NOTES
PT lying in bed, respirations even, unlabored, eyes open spontaneously, NAD noted, call bell within reach, watching tv, drinking fluid given by CT. PT updated on plan of care, will continue to monitor.

## 2019-05-22 NOTE — ED NOTES
Pt lying in bed, respirations even, unlabored, NAD noted, pt reports having ostomy bag leak, pt cleans and area cleaned and re applied to pt. PT updated on plan of care, will continue to monitor. Pt encouraged to give urine specimen, urine specimen cup given to pt, will continue to monitor

## 2019-05-22 NOTE — CONSULTS
.[facility]  Gastroenterology  Consult Note    Patient Name: Rani Carvajal  MRN: [id number]  Admission Date: 5/21/2019  Hospital Length of Stay: 0 days  Code Status: Full Code   Primary Care Physician: Shanita Correia MD  Principal Problem:Abdominal pain    Consults  Subjective:     Chief complaint: abdominal pain    HPI: Rani Carvajal is a 48 y.o. female with a history of arthritis, fibromyalgia and Crohn's diseaes requiring ileostomy 20+ years ago who presented with abdominal pain for the last week.  She was seen here in April and imaging showed a possible abscess.  At that time she reported a sharp severe abdominal pain below the umbilicus.  This resolved, but returned 2 days ago.  Again, the pain is below the umbilicus, sharp, and severe.  No obvious aggravating or relieving factors.  Therefore, she returned to hospital.  A CT scan this admission seems to show improvement of the previously seen abscess.  However, it was noted that a small amount of contrast was seen tracking into the right lower abdomen in the region of the previous abscess.  This may represent a small contained perforation or sinus tract.       She has had Crohn's for over 20 years, she thinks involving both her small and large intestine.  She reports 30+ abdominal surgeries due to fistulas, perforations, abscesses, and hernias.  She says her doctors in [place] had planned a temporary ileostomy when they first performed the surgery, but things never healed, so it was never reversed.  She says she still has her colon (but CT reports changes of a colectomy...) and intermittently passes a mucus discharge from her rectum.  She is not sure if she's ever had a perianal fistula or abscess.  She has had no blood in her ileostomy bag and she denies any changes in the stool output recently.  She denies a history of recurrent UTIs and currently denies dysuria, frequency, urgency or hematuria.  She has not been on any  medications for her Crohn's recently. She didn't tolerate Pentasa well (can't recall why) and did okay with remicade.  She has an allergy to imuran (pancreatitis).  She has never been on Humira or the other newer IBD drugs.  She has been in [place] for the last 4 years after moving from [place].   she has not seen a gastroenterologist here as an outpatient.  She had made an appointment to see Dr. Fall.    She denies oral ulcers, eye symptoms, joint pains, or rashes.     Past medical history:  Past Medical History:   Diagnosis Date    Arthritis     Crohn disease     Fibromyalgia        Past surgical history:  Past Surgical History:   Procedure Laterality Date    ABDOMINAL SURGERY      COLON SURGERY      ILEOSTOMY         Social history:  Social History     Socioeconomic History    Marital status: Single     Spouse name: Not on file    Number of children: Not on file    Years of education: Not on file    Highest education level: Not on file   Occupational History    Not on file   Social Needs    Financial resource strain: Not on file    Food insecurity:     Worry: Not on file     Inability: Not on file    Transportation needs:     Medical: Not on file     Non-medical: Not on file   Tobacco Use    Smoking status: Current Every Day Smoker     Packs/day: 0.25     Types: Cigarettes    Smokeless tobacco: Current User   Substance and Sexual Activity    Alcohol use: Never     Frequency: Never    Drug use: Never    Sexual activity: Not on file   Lifestyle    Physical activity:     Days per week: Not on file     Minutes per session: Not on file    Stress: Not on file   Relationships    Social connections:     Talks on phone: Not on file     Gets together: Not on file     Attends Gnosticist service: Not on file     Active member of club or organization: Not on file     Attends meetings of clubs or organizations: Not on file     Relationship status: Not on file   Other Topics Concern    Not on  file   Social History Narrative    Not on file       Family history:  No family history on file.    Medications:  Medications Prior to Admission   Medication Sig Dispense Refill Last Dose    duloxetine (CYMBALTA) 60 MG capsule Take 60 mg by mouth 2 (two) times daily.    5/20/2019    folic acid (FOLVITE) 1 MG tablet Take 1 mg by mouth once daily.   5/20/2019    oxycodone (ROXICODONE) 30 MG Tab Take 30 mg by mouth every 6 (six) hours as needed.    5/20/2019    oxycodone 20 mg/ml oral conc (ROXICODONE INTENSOL) 20 mg/mL concentrated solution Take 20 mg by mouth 4 (four) times daily as needed for Pain.    5/20/2019    pantoprazole (PROTONIX) 40 MG tablet Take 40 mg by mouth once daily.   5/20/2019    QUEtiapine (SEROQUEL) 100 MG Tab Take 100 mg by mouth.    5/20/2019       Allergies:  Review of patient's allergies indicates:   Allergen Reactions    Imuran [azathioprine sodium] Other (See Comments)     pancreatitis    Penicillins Hives    Sulfa (sulfonamide antibiotics) Nausea Only and Rash       Review of systems:  CONSTITUTIONAL: Negative for fever, chills, weight loss, weight gain.  HEENT: Negative for blurred vision, hearing loss, nasal congestion, dry mouth, sore throat.  CARDIOVASCULAR: Negative for chest pain or palpitations.  RESPIRATORY: Negative for SOB or cough.  GASTROINTESTINAL: See HPI  GENITOURINARY: Negative for dysuria or hematuria.  MUSCULOSKELETAL: Negative for osteoarthritis or muscle pain.  SKIN: Negative for rashes/lesions.  NEUROLOGIC: Occasional headaches, No numbness/tingling.  ENDOCRINE: Negative for diabetes or thyroid abnormalities.  HEMATOLOGIC: Negative for anemia or blood dyscrasias.  Aside from above positives, complete 10 point review of systems negative.    Objective:     Vital Signs (Most Recent):  Temp: 98 °F (36.7 °C) (05/22/19 1538)  Pulse: (!) 58 (05/22/19 1538)  Resp: 18 (05/22/19 1538)  BP: (!) 100/59 (05/22/19 1538)  SpO2: 97 % (05/22/19 1538) Vital Signs (24h  Range):  Temp:  [97.2 °F (36.2 °C)-99.9 °F (37.7 °C)] 98 °F (36.7 °C)  Pulse:  [] 58  Resp:  [18] 18  SpO2:  [94 %-99 %] 97 %  BP: (100-142)/(59-92) 100/59     Physical examination:  General: well developed, well nourished, no apparent distress  HENT: NCAT, hearing grossly intact, no palpable or visible thyroid mass  Eyes: PERRL, EOMI, anicteric sclera  LYMH: No cervical, supraclavicular or axillary lymphadenopathy   Cardiovascular: Regular rate and rhythm. No murmurs appreciated.  Lungs: Non-labored respirations. Breath sounds equal.   Abdomen: soft, with tenderness in the lower mid abdomen.  There is an ileostomy in the lower abdomen with brown stool in the bag.  No guarding or rebound.  Normal bowel sounds.  Extremities: No C/C/E, 2+ dorsalis pedis pulses bilaterally  Neuro: AA&O x 3, no asterixes or tremors  Psych:  Somewhat anxious  Skin: No jaundice, rashes or lesions  Musculoskeletal:  No deformity    Labs:  CBC:   Recent Labs   Lab 05/21/19  2153 05/22/19  0420   WBC 13.17* 10.02   HGB 14.8 13.2   HCT 44.2 39.7    328     CMP:   Recent Labs   Lab 05/22/19  0420   GLU 92   CALCIUM 9.0   ALBUMIN 3.6   PROT 7.2   *   K 3.2*   CO2 21*      BUN 7   CREATININE 0.9   ALKPHOS 76   ALT 9*   AST 12   BILITOT 0.4       Imaging:  Imaging results within the past 24 hours have been reviewed.    CT:  1. Postsurgical changes of subtotal colectomy with left lower quadrant ileostomy.  No evidence of bowel obstruction.  2. Small amount of contrast seen tracking into the right lower abdomen in region of previously visualized abscess which may represent small contained perforation or sinus tract.  Previously visualized abscess in this location has otherwise resolved.  3. Interval decrease size of small 1.5 cm hypodense collection within left lower abdomen region of previous abscess.  No organized drainable fluid collection or abscess seen within the abdomen at this time.    Assessment:   1.  Crohn's  disease for many years.  Recent admission for abdominal abscess.  Her now with her current lower abdominal pain.  It is unclear whether not this is related to active Crohn's.  CT showed improvement of the abscess, but contrast did extravasated into a possible sinus tract or possible walled-off perforation.  Surgery has been consulted.    Plan:   1.  Ileoscopy and colonoscopy tomorrow to evaluate for active Crohn's  2.  Surgery consulted  3.  Follow up with GI after discharge.        Thank you for your consult.     Santiago Sims MD  Gastroenterology  [facility]

## 2019-05-22 NOTE — SUBJECTIVE & OBJECTIVE
Past Medical History:   Diagnosis Date    Arthritis     Crohn disease     Fibromyalgia        Past Surgical History:   Procedure Laterality Date    ABDOMINAL SURGERY      COLON SURGERY      ILEOSTOMY         Review of patient's allergies indicates:   Allergen Reactions    Imuran [azathioprine sodium] Other (See Comments)     pancreatitis    Penicillins Hives    Sulfa (sulfonamide antibiotics) Nausea Only and Rash       No current facility-administered medications on file prior to encounter.      Current Outpatient Medications on File Prior to Encounter   Medication Sig    duloxetine (CYMBALTA) 60 MG capsule Take 60 mg by mouth 2 (two) times daily.     folic acid (FOLVITE) 1 MG tablet Take 1 mg by mouth once daily.    oxycodone (ROXICODONE) 30 MG Tab Take 30 mg by mouth every 6 (six) hours as needed.     oxycodone 20 mg/ml oral conc (ROXICODONE INTENSOL) 20 mg/mL concentrated solution Take 20 mg by mouth 4 (four) times daily as needed for Pain.     pantoprazole (PROTONIX) 40 MG tablet Take 40 mg by mouth once daily.    QUEtiapine (SEROQUEL) 100 MG Tab Take 100 mg by mouth.      Family History     None        Tobacco Use    Smoking status: Current Every Day Smoker     Packs/day: 0.25     Types: Cigarettes    Smokeless tobacco: Current User   Substance and Sexual Activity    Alcohol use: Never     Frequency: Never    Drug use: Never    Sexual activity: Not on file     Review of Systems   Constitutional: Positive for activity change, appetite change and fatigue. Negative for fever.   HENT: Negative for congestion, ear pain, rhinorrhea and sinus pressure.    Eyes: Negative for pain and discharge.   Respiratory: Negative for cough, chest tightness, shortness of breath and wheezing.    Cardiovascular: Negative for chest pain and leg swelling.   Gastrointestinal: Positive for abdominal distention and abdominal pain. Negative for diarrhea, nausea and vomiting.   Endocrine: Negative for cold intolerance  and heat intolerance.   Genitourinary: Negative for difficulty urinating, flank pain, frequency, hematuria and urgency.   Musculoskeletal: Positive for arthralgias and myalgias. Negative for joint swelling.   Allergic/Immunologic: Negative for environmental allergies and food allergies.   Neurological: Negative for dizziness, weakness, light-headedness and headaches.   Hematological: Does not bruise/bleed easily.   Psychiatric/Behavioral: Negative for agitation, behavioral problems and decreased concentration.     Objective:     Vital Signs (Most Recent):  Temp: 98 °F (36.7 °C) (05/22/19 0237)  Pulse: 70 (05/22/19 0237)  Resp: 18 (05/22/19 0237)  BP: 103/69 (05/22/19 0237)  SpO2: 98 % (05/22/19 0237) Vital Signs (24h Range):  Temp:  [98 °F (36.7 °C)-99.9 °F (37.7 °C)] 98 °F (36.7 °C)  Pulse:  [] 70  Resp:  [18] 18  SpO2:  [94 %-98 %] 98 %  BP: (103-142)/(66-92) 103/69     Weight: 49.9 kg (110 lb)  Body mass index is 21.48 kg/m².    Physical Exam   Constitutional: She is oriented to person, place, and time. She appears well-developed and well-nourished.   HENT:   Head: Normocephalic.   Eyes: Conjunctivae are normal. Right eye exhibits no discharge. Left eye exhibits no discharge.   Neck: Normal range of motion. Neck supple.   Cardiovascular: Normal rate and normal heart sounds.   Pulmonary/Chest: Effort normal. Tachypnea noted. No respiratory distress. She has decreased breath sounds in the right lower field and the left lower field.   Abdominal: Soft. She exhibits no distension. Bowel sounds are increased. There is tenderness in the left lower quadrant.   illeostomy noted   Musculoskeletal: Normal range of motion.   Neurological: She is alert and oriented to person, place, and time. GCS eye subscore is 4. GCS verbal subscore is 5. GCS motor subscore is 6.   Skin: Skin is warm and dry.   Psychiatric: She has a normal mood and affect. Her speech is normal and behavior is normal.           Significant Labs:    CBC:   Recent Labs   Lab 05/21/19  2153   WBC 13.17*   HGB 14.8   HCT 44.2        CMP:   Recent Labs   Lab 05/21/19  2153      K 3.6      CO2 23      BUN 7   CREATININE 0.9   CALCIUM 9.5   PROT 8.0   ALBUMIN 3.9   BILITOT 0.3   ALKPHOS 87   AST 15   ALT 10   ANIONGAP 12   EGFRNONAA >60       Significant Imaging: I have reviewed all pertinent imaging results/findings within the past 24 hours.

## 2019-05-23 ENCOUNTER — ANESTHESIA (OUTPATIENT)
Dept: ENDOSCOPY | Facility: OTHER | Age: 49
End: 2019-05-23
Payer: MEDICARE

## 2019-05-23 ENCOUNTER — ANESTHESIA EVENT (OUTPATIENT)
Dept: ENDOSCOPY | Facility: OTHER | Age: 49
End: 2019-05-23
Payer: MEDICARE

## 2019-05-23 VITALS
DIASTOLIC BLOOD PRESSURE: 61 MMHG | WEIGHT: 110 LBS | RESPIRATION RATE: 18 BRPM | BODY MASS INDEX: 21.6 KG/M2 | HEIGHT: 60 IN | SYSTOLIC BLOOD PRESSURE: 99 MMHG | HEART RATE: 66 BPM | TEMPERATURE: 98 F | OXYGEN SATURATION: 99 %

## 2019-05-23 PROBLEM — E87.6 HYPOKALEMIA: Status: ACTIVE | Noted: 2019-05-23

## 2019-05-23 LAB
ALBUMIN SERPL BCP-MCNC: 3.5 G/DL (ref 3.5–5.2)
ALP SERPL-CCNC: 71 U/L (ref 55–135)
ALT SERPL W/O P-5'-P-CCNC: 6 U/L (ref 10–44)
ANION GAP SERPL CALC-SCNC: 9 MMOL/L (ref 8–16)
AST SERPL-CCNC: 10 U/L (ref 10–40)
BASOPHILS # BLD AUTO: 0.08 K/UL (ref 0–0.2)
BASOPHILS NFR BLD: 0.9 % (ref 0–1.9)
BILIRUB SERPL-MCNC: 0.4 MG/DL (ref 0.1–1)
BUN SERPL-MCNC: 7 MG/DL (ref 6–20)
CALCIUM SERPL-MCNC: 9.1 MG/DL (ref 8.7–10.5)
CHLORIDE SERPL-SCNC: 108 MMOL/L (ref 95–110)
CO2 SERPL-SCNC: 20 MMOL/L (ref 23–29)
CREAT SERPL-MCNC: 0.8 MG/DL (ref 0.5–1.4)
DIFFERENTIAL METHOD: ABNORMAL
EOSINOPHIL # BLD AUTO: 0.2 K/UL (ref 0–0.5)
EOSINOPHIL NFR BLD: 2.3 % (ref 0–8)
ERYTHROCYTE [DISTWIDTH] IN BLOOD BY AUTOMATED COUNT: 14.8 % (ref 11.5–14.5)
EST. GFR  (AFRICAN AMERICAN): >60 ML/MIN/1.73 M^2
EST. GFR  (NON AFRICAN AMERICAN): >60 ML/MIN/1.73 M^2
GLUCOSE SERPL-MCNC: 75 MG/DL (ref 70–110)
HCT VFR BLD AUTO: 40.1 % (ref 37–48.5)
HGB BLD-MCNC: 13 G/DL (ref 12–16)
LYMPHOCYTES # BLD AUTO: 2.4 K/UL (ref 1–4.8)
LYMPHOCYTES NFR BLD: 27.1 % (ref 18–48)
MAGNESIUM SERPL-MCNC: 2.2 MG/DL (ref 1.6–2.6)
MCH RBC QN AUTO: 32.2 PG (ref 27–31)
MCHC RBC AUTO-ENTMCNC: 32.4 G/DL (ref 32–36)
MCV RBC AUTO: 99 FL (ref 82–98)
MONOCYTES # BLD AUTO: 0.5 K/UL (ref 0.3–1)
MONOCYTES NFR BLD: 5.4 % (ref 4–15)
NEUTROPHILS # BLD AUTO: 5.6 K/UL (ref 1.8–7.7)
NEUTROPHILS NFR BLD: 64.2 % (ref 38–73)
PLATELET # BLD AUTO: 306 K/UL (ref 150–350)
PMV BLD AUTO: 10.1 FL (ref 9.2–12.9)
POTASSIUM SERPL-SCNC: 3.8 MMOL/L (ref 3.5–5.1)
PROT SERPL-MCNC: 6.9 G/DL (ref 6–8.4)
RBC # BLD AUTO: 4.04 M/UL (ref 4–5.4)
SODIUM SERPL-SCNC: 137 MMOL/L (ref 136–145)
WBC # BLD AUTO: 8.72 K/UL (ref 3.9–12.7)

## 2019-05-23 PROCEDURE — 83735 ASSAY OF MAGNESIUM: CPT

## 2019-05-23 PROCEDURE — 25000003 PHARM REV CODE 250: Performed by: NURSE PRACTITIONER

## 2019-05-23 PROCEDURE — 80053 COMPREHEN METABOLIC PANEL: CPT

## 2019-05-23 PROCEDURE — 36415 COLL VENOUS BLD VENIPUNCTURE: CPT

## 2019-05-23 PROCEDURE — 37000009 HC ANESTHESIA EA ADD 15 MINS: Performed by: INTERNAL MEDICINE

## 2019-05-23 PROCEDURE — G0378 HOSPITAL OBSERVATION PER HR: HCPCS

## 2019-05-23 PROCEDURE — 44388 COLONOSCOPY THRU STOMA SPX: CPT | Performed by: INTERNAL MEDICINE

## 2019-05-23 PROCEDURE — 94761 N-INVAS EAR/PLS OXIMETRY MLT: CPT

## 2019-05-23 PROCEDURE — 63600175 PHARM REV CODE 636 W HCPCS: Performed by: NURSE ANESTHETIST, CERTIFIED REGISTERED

## 2019-05-23 PROCEDURE — 99217 PR OBSERVATION CARE DISCHARGE: CPT | Mod: ,,, | Performed by: HOSPITALIST

## 2019-05-23 PROCEDURE — 63600175 PHARM REV CODE 636 W HCPCS: Performed by: NURSE PRACTITIONER

## 2019-05-23 PROCEDURE — 85025 COMPLETE CBC W/AUTO DIFF WBC: CPT

## 2019-05-23 PROCEDURE — 25000003 PHARM REV CODE 250: Performed by: INTERNAL MEDICINE

## 2019-05-23 PROCEDURE — 37000008 HC ANESTHESIA 1ST 15 MINUTES: Performed by: INTERNAL MEDICINE

## 2019-05-23 PROCEDURE — 99217 PR OBSERVATION CARE DISCHARGE: ICD-10-PCS | Mod: ,,, | Performed by: HOSPITALIST

## 2019-05-23 PROCEDURE — 25000003 PHARM REV CODE 250: Performed by: ANESTHESIOLOGY

## 2019-05-23 PROCEDURE — 25000003 PHARM REV CODE 250: Performed by: NURSE ANESTHETIST, CERTIFIED REGISTERED

## 2019-05-23 RX ORDER — SODIUM CHLORIDE, SODIUM LACTATE, POTASSIUM CHLORIDE, CALCIUM CHLORIDE 600; 310; 30; 20 MG/100ML; MG/100ML; MG/100ML; MG/100ML
INJECTION, SOLUTION INTRAVENOUS CONTINUOUS PRN
Status: DISCONTINUED | OUTPATIENT
Start: 2019-05-23 | End: 2019-05-23

## 2019-05-23 RX ORDER — PROPOFOL 10 MG/ML
VIAL (ML) INTRAVENOUS
Status: DISCONTINUED | OUTPATIENT
Start: 2019-05-23 | End: 2019-05-23

## 2019-05-23 RX ORDER — EPHEDRINE SULFATE 50 MG/ML
INJECTION, SOLUTION INTRAVENOUS
Status: DISCONTINUED | OUTPATIENT
Start: 2019-05-23 | End: 2019-05-23

## 2019-05-23 RX ORDER — HYDROMORPHONE HYDROCHLORIDE 2 MG/ML
0.4 INJECTION, SOLUTION INTRAMUSCULAR; INTRAVENOUS; SUBCUTANEOUS EVERY 5 MIN PRN
Status: DISCONTINUED | OUTPATIENT
Start: 2019-05-23 | End: 2019-05-23

## 2019-05-23 RX ORDER — MEPERIDINE HYDROCHLORIDE 25 MG/ML
12.5 INJECTION INTRAMUSCULAR; INTRAVENOUS; SUBCUTANEOUS ONCE AS NEEDED
Status: DISCONTINUED | OUTPATIENT
Start: 2019-05-23 | End: 2019-05-23

## 2019-05-23 RX ORDER — DIPHENHYDRAMINE HYDROCHLORIDE 50 MG/ML
25 INJECTION INTRAMUSCULAR; INTRAVENOUS EVERY 6 HOURS PRN
Status: DISCONTINUED | OUTPATIENT
Start: 2019-05-23 | End: 2019-05-23 | Stop reason: HOSPADM

## 2019-05-23 RX ORDER — OXYCODONE HYDROCHLORIDE 5 MG/1
5 TABLET ORAL
Status: DISCONTINUED | OUTPATIENT
Start: 2019-05-23 | End: 2019-05-23 | Stop reason: HOSPADM

## 2019-05-23 RX ORDER — ONDANSETRON 2 MG/ML
INJECTION INTRAMUSCULAR; INTRAVENOUS
Status: DISCONTINUED | OUTPATIENT
Start: 2019-05-23 | End: 2019-05-23

## 2019-05-23 RX ORDER — ONDANSETRON 2 MG/ML
4 INJECTION INTRAMUSCULAR; INTRAVENOUS DAILY PRN
Status: DISCONTINUED | OUTPATIENT
Start: 2019-05-23 | End: 2019-05-23 | Stop reason: HOSPADM

## 2019-05-23 RX ORDER — SODIUM CHLORIDE 0.9 % (FLUSH) 0.9 %
3 SYRINGE (ML) INJECTION
Status: DISCONTINUED | OUTPATIENT
Start: 2019-05-23 | End: 2019-05-23 | Stop reason: HOSPADM

## 2019-05-23 RX ORDER — PROPOFOL 10 MG/ML
VIAL (ML) INTRAVENOUS CONTINUOUS PRN
Status: DISCONTINUED | OUTPATIENT
Start: 2019-05-23 | End: 2019-05-23

## 2019-05-23 RX ORDER — LIDOCAINE HCL/PF 100 MG/5ML
SYRINGE (ML) INTRAVENOUS
Status: DISCONTINUED | OUTPATIENT
Start: 2019-05-23 | End: 2019-05-23

## 2019-05-23 RX ADMIN — MORPHINE SULFATE 2 MG: 2 INJECTION, SOLUTION INTRAMUSCULAR; INTRAVENOUS at 02:05

## 2019-05-23 RX ADMIN — HEPARIN SODIUM 5000 UNITS: 5000 INJECTION, SOLUTION INTRAVENOUS; SUBCUTANEOUS at 01:05

## 2019-05-23 RX ADMIN — ONDANSETRON 4 MG: 2 INJECTION INTRAMUSCULAR; INTRAVENOUS at 07:05

## 2019-05-23 RX ADMIN — MORPHINE SULFATE 2 MG: 2 INJECTION, SOLUTION INTRAMUSCULAR; INTRAVENOUS at 10:05

## 2019-05-23 RX ADMIN — ONDANSETRON 8 MG: 8 TABLET, ORALLY DISINTEGRATING ORAL at 02:05

## 2019-05-23 RX ADMIN — POLYETHYLENE GLYCOL 3350, SODIUM SULFATE ANHYDROUS, SODIUM BICARBONATE, SODIUM CHLORIDE, POTASSIUM CHLORIDE 2 L: 236; 22.74; 6.74; 5.86; 2.97 POWDER, FOR SOLUTION ORAL at 02:05

## 2019-05-23 RX ADMIN — EPHEDRINE SULFATE 10 MG: 50 INJECTION INTRAMUSCULAR; INTRAVENOUS; SUBCUTANEOUS at 07:05

## 2019-05-23 RX ADMIN — MORPHINE SULFATE 2 MG: 2 INJECTION, SOLUTION INTRAMUSCULAR; INTRAVENOUS at 06:05

## 2019-05-23 RX ADMIN — LIDOCAINE HYDROCHLORIDE 50 MG: 20 INJECTION, SOLUTION INTRAVENOUS at 07:05

## 2019-05-23 RX ADMIN — SODIUM CHLORIDE, SODIUM LACTATE, POTASSIUM CHLORIDE, AND CALCIUM CHLORIDE: 600; 310; 30; 20 INJECTION, SOLUTION INTRAVENOUS at 07:05

## 2019-05-23 RX ADMIN — EPHEDRINE SULFATE 10 MG: 50 INJECTION INTRAMUSCULAR; INTRAVENOUS; SUBCUTANEOUS at 08:05

## 2019-05-23 RX ADMIN — PROPOFOL 100 MCG/KG/MIN: 10 INJECTION, EMULSION INTRAVENOUS at 07:05

## 2019-05-23 RX ADMIN — PROPOFOL 40 MG: 10 INJECTION, EMULSION INTRAVENOUS at 07:05

## 2019-05-23 RX ADMIN — HEPARIN SODIUM 5000 UNITS: 5000 INJECTION, SOLUTION INTRAVENOUS; SUBCUTANEOUS at 06:05

## 2019-05-23 RX ADMIN — PROPOFOL 30 MG: 10 INJECTION, EMULSION INTRAVENOUS at 08:05

## 2019-05-23 NOTE — ANESTHESIA PREPROCEDURE EVALUATION
05/23/2019  Rani Carvajal is a 49 y.o., female.    Anesthesia Evaluation    I have reviewed the Patient Summary Reports.    I have reviewed the Nursing Notes.   I have reviewed the Medications.     Review of Systems  Anesthesia Hx:  No problems with previous Anesthesia  Denies Family Hx of Anesthesia complications.   Denies Personal Hx of Anesthesia complications.   Social:  Smoker    Hepatic/GI:   Crohns and ileostomy   OB/GYN/PEDS:  Poss vag fistula       Physical Exam  General:  Malnutrition      Dental:  Dental Findings: Upper Dentures        Mental Status:  Mental Status Findings:  Cooperative, Alert and Oriented         Anesthesia Plan  Type of Anesthesia, risks & benefits discussed:  Anesthesia Type:  general  Patient's Preference:   Intra-op Monitoring Plan: standard ASA monitors  Intra-op Monitoring Plan Comments:   Post Op Pain Control Plan:   Post Op Pain Control Plan Comments:   Induction:   IV  Beta Blocker:         Informed Consent: Patient understands risks and agrees with Anesthesia plan.  Questions answered. Anesthesia consent signed with patient.  ASA Score: 3     Day of Surgery Review of History & Physical:    H&P update referred to the surgeon.         Ready For Surgery From Anesthesia Perspective.

## 2019-05-23 NOTE — DISCHARGE SUMMARY
"[facility] Hospital Medicine  Discharge Summary      Patient Name: Rani Carvajal  MRN: [id number]  Admission Date: 5/21/2019  Hospital Length of Stay: 0 days  Discharge Date and Time: 5/23/2019  2:14 PM  Attending Physician: No att. providers found   Discharging Provider: Dirk Arias MD  Primary Care Provider: Shanita Correia MD      HPI:   The patient is a 49 y.o. female with a history of crohn's disease who presents with complaint of abdominal pain that began this morning. The abdominal pain is currently rated a 9/10. She reports that she is also experiencing fever, chills, and nausea. The patient one month ago was treated for an abscess and states "they went the antibiotic route because surgery could do more harm than good". The patient reports that she was compliant with her antibiotics regimen. She denies fever, sore throat, chest pain, shortness of breath, vomiting, and dysuria. The patient admits to smoking, but denies drinking and illicit drug use.      Procedure(s) (LRB):  COLONOSCOPY/ileoscopy through stoma (N/A)      Consults:   Consults (From admission, onward)        Status Ordering Provider     Inpatient consult to Gastroenterology  Once     Provider:  Renetta Stein MD    Completed DIRK ARIAS        Hospital Course By Problem:   * Abdominal pain  -Mrs. Carvajal was placed in observation status.  -CT- Postsurgical changes of subtotal colectomy with left lower quadrant ileostomy.  No evidence of bowel obstruction.  Small amount of contrast seen tracking into the right lower abdomen in region of previously visualized abscess which may represent small contained perforation or sinus tract. Previously visualized abscess in this location has otherwise resolved.  Interval decrease size of small 1.5 cm hypodense collection within left lower abdomen region of previous abscess.  No organized drainable fluid collection or abscess seen within the abdomen at this time.  -She is " afebrile.  Initial minimal leukocytosis resolved spontaneously.  Her ESR and CRP are normal.  She has no tacycardia.    -She was taken for ileoscopy on 5/22 which showed no evidence of active crohn's disease to 20cm.  They were unable to do colonoscopy due to anal stenosis.  -She was eating without difficulty so cleared for discharge home.  -I recommended a low residue diet but if there was any nausea at home recommended tiral of a liquid diet before coming into hospital.      Hypokalemia  -Replaced and normal on day of discharge.      Moderate malnutrition  -Noted.        Fibromyalgia  -Resume home cymbalta and seroquel at discharge        Final Active Diagnoses:    Diagnosis Date Noted POA    PRINCIPAL PROBLEM:  Abdominal pain [R10.9] 05/22/2019 Yes    Hypokalemia [E87.6] 05/23/2019 No    Moderate malnutrition [E44.0] 05/22/2019 Yes    Fibromyalgia [M79.7] 04/22/2019 Yes     Chronic      Problems Resolved During this Admission:       Discharged Condition: fair    Disposition: Home or Self Care    Follow Up:  Follow-up Information     Shanita Correia MD In 1 week.    Specialty:  Family Medicine  Contact information:  [address] [facility] [place] [address] [phone]             Aayush Fall MD In 1 week.    Specialty:  Gastroenterology  Contact information:  [address]  [address] [address] [phone]                 Patient Instructions:      Diet Adult Regular   Order Comments: LOW RESIDUE DIET ONLY.  IF ANY NAUSEA THEN ONLY TAKE A FULL LIQUID DIET     Notify your health care provider if you experience any of the following:  increased confusion or weakness     Notify your health care provider if you experience any of the following:  persistent dizziness, light-headedness, or visual disturbances     Notify your health care provider if you experience any of the following:  worsening rash     Notify your health care provider if you experience any of the following:  severe  persistent headache     Notify your health care provider if you experience any of the following:  difficulty breathing or increased cough     Notify your health care provider if you experience any of the following:  severe uncontrolled pain     Notify your health care provider if you experience any of the following:  persistent nausea and vomiting or diarrhea     Notify your health care provider if you experience any of the following:  temperature >100.4     Activity as tolerated       Significant Diagnostic Studies: Labs:   BMP:   Recent Labs   Lab 05/21/19 2153 05/22/19 0420 05/23/19  0556    92 75    134* 137   K 3.6 3.2* 3.8    105 108   CO2 23 21* 20*   BUN 7 7 7   CREATININE 0.9 0.9 0.8   CALCIUM 9.5 9.0 9.1   MG  --  2.1 2.2   , CMP   Recent Labs   Lab 05/21/19 2153 05/22/19 0420 05/23/19  0556    134* 137   K 3.6 3.2* 3.8    105 108   CO2 23 21* 20*    92 75   BUN 7 7 7   CREATININE 0.9 0.9 0.8   CALCIUM 9.5 9.0 9.1   PROT 8.0 7.2 6.9   ALBUMIN 3.9 3.6 3.5   BILITOT 0.3 0.4 0.4   ALKPHOS 87 76 71   AST 15 12 10   ALT 10 9* 6*   ANIONGAP 12 8 9   ESTGFRAFRICA >60 >60 >60   EGFRNONAA >60 >60 >60    and CBC   Recent Labs   Lab 05/21/19 2153 05/22/19 0420 05/23/19  0556   WBC 13.17* 10.02 8.72   HGB 14.8 13.2 13.0   HCT 44.2 39.7 40.1    328 306       Pending Diagnostic Studies:     None         Medications:  Reconciled Home Medications:      Medication List      CONTINUE taking these medications    DULoxetine 60 MG capsule  Commonly known as:  CYMBALTA  Take 60 mg by mouth 2 (two) times daily.     folic acid 1 MG tablet  Commonly known as:  FOLVITE  Take 1 mg by mouth once daily.     * oxycodone 20 mg/ml oral conc 20 mg/mL concentrated solution  Commonly known as:  ROXICODONE INTENSOL  Take 20 mg by mouth 4 (four) times daily as needed for Pain.     * oxyCODONE 30 MG Tab  Commonly known as:  ROXICODONE  Take 30 mg by mouth every 6 (six) hours as needed.      pantoprazole 40 MG tablet  Commonly known as:  PROTONIX  Take 40 mg by mouth once daily.     QUEtiapine 100 MG Tab  Commonly known as:  SEROQUEL  Take 100 mg by mouth.         * This list has 2 medication(s) that are the same as other medications prescribed for you. Read the directions carefully, and ask your doctor or other care provider to review them with you.                Indwelling Lines/Drains at time of discharge:   Lines/Drains/Airways     Central Venous Catheter Line                 Port A Cath Single Lumen left subclavian -- days          Drain                 Ileostomy LLQ -- days                Time spent on the discharge of patient: 35 minutes  Patient was seen and examined on the date of discharge and determined to be suitable for discharge.         Gabriel Arias MD  Department of Hospital Medicine  [facility]

## 2019-05-23 NOTE — PROGRESS NOTES
Ileoscopy:  -No active Crohn's to 20cm  -Anal stenosis. Unable to examine remaining colon.    Surgery comments noted.

## 2019-05-23 NOTE — PROGRESS NOTES
Pt known to me with Hx of Crohn's disease presents with Abd pain, leucocytosis  Long standing entero vaginal fistula  Hx subtotal colectomy/ileostomy/ weinberg's pouch  Abd--soft, diffuse tenderness    Imaging  Extravasation contrast, likely sb-vaginal fistula    IMP  Symptomatic Crohn's Disease, fistula  Complex problem, surgery obvious last option  Will d/w GI med

## 2019-05-23 NOTE — OR NURSING
Pt AAOx3, and VSS back to baseline. Report called to inpatient nurse and pt placed on transport list

## 2019-05-23 NOTE — TRANSFER OF CARE
Anesthesia Transfer of Care Note    Patient: Rani Carvajal    Procedure(s) Performed: Procedure(s) (LRB):  COLONOSCOPY/ileoscopy through stoma (N/A)    Patient location: PACU    Anesthesia Type: general    Transport from OR: Transported from OR on 2-3 L/min O2 by NC with adequate spontaneous ventilation    Post pain: adequate analgesia    Post assessment: no apparent anesthetic complications    Post vital signs: stable    Level of consciousness: awake    Nausea/Vomiting: no nausea/vomiting    Complications: none    Transfer of care protocol was followed      Last vitals:   Visit Vitals  BP (!) 87/50   Pulse 65   Temp 36.4 °C (97.6 °F) (Oral)   Resp 16   Ht 5' (1.524 m)   Wt 49.9 kg (110 lb 0.2 oz)   LMP  (LMP Unknown)   SpO2 99%   Breastfeeding? No   BMI 21.48 kg/m²

## 2019-05-23 NOTE — [employer] PATIENT INSTRUCTIONS
Discharge Summary/Instructions after an Endoscopic Procedure  Patient Name: Rani Carvajal  Patient MRN: [id number]  Patient YOB: 1970  Thursday, May 23, 2019  Santiago Sims MD  RESTRICTIONS:  During your procedure today, you received medications for sedation.  These   medications may affect your judgment, balance and coordination.  Therefore,   for 24 hours, you have the following restrictions:   - DO NOT drive a car, operate machinery, make legal/financial decisions,   sign important papers or drink alcohol.    ACTIVITY:  Today: no heavy lifting, straining or running due to procedural   sedation/anesthesia.  The following day: return to full activity including work.  DIET:  Eat and drink normally unless instructed otherwise.     TREATMENT FOR COMMON SIDE EFFECTS:  - Mild abdominal pain, nausea, belching, bloating or excessive gas:  rest,   eat lightly and use a heating pad.  - Sore Throat: treat with throat lozenges and/or gargle with warm salt   water.  - Because air was used during the procedure, expelling large amounts of air   from your rectum or belching is normal.  - If a bowel prep was taken, you may not have a bowel movement for 1-3 days.    This is normal.  SYMPTOMS TO WATCH FOR AND REPORT TO YOUR PHYSICIAN:  1. Abdominal pain or bloating, other than gas cramps.  2. Chest pain.  3. Back pain.  4. Signs of infection such as: chills or fever occurring within 24 hours   after the procedure.  5. Rectal bleeding, which would show as bright red, maroon, or black stools.   (A tablespoon of blood from the rectum is not serious, especially if   hemorrhoids are present.)  6. Vomiting.  7. Weakness or dizziness.  GO DIRECTLY TO THE NEAREST EMERGENCY ROOM IF YOU HAVE ANY OF THE FOLLOWING:      Difficulty breathing              Chills and/or fever over 101 F   Persistent vomiting and/or vomiting blood   Severe abdominal pain   Severe chest pain   Black, tarry stools   Bleeding- more than one tablespoon   Any  other symptom or condition that you feel may need urgent attention  Your doctor recommends these additional instructions:  If any biopsies were taken, your doctors clinic will contact you in 1 to 2   weeks with any results.  - [facility] patient to [facility] for [facility] care.   - Resume regular diet.  For questions, problems or results please call your physician - Santiago Sims MD at Work:  [phone].  [facility], EMERGENCY ROOM PHONE NUMBER: [phone], [facility] ([place]   [phone].  IF A COMPLICATION OR EMERGENCY SITUATION ARISES AND YOU ARE UNABLE TO REACH   YOUR PHYSICIAN - GO DIRECTLY TO THE EMERGENCY ROOM.  Santiago Sims MD  5/23/2019 8:23:04 AM  This report has been verified and signed electronically.  [employer]

## 2019-05-23 NOTE — NURSING
Pt stated she would probably go AMA tonight.  She threatened to rip out EJ IV. She was educated on the possible side effects of doing that.

## 2019-05-23 NOTE — ANESTHESIA POSTPROCEDURE EVALUATION
Anesthesia Post Evaluation    Patient: Rani Carvajal    Procedure(s) Performed: Procedure(s) (LRB):  COLONOSCOPY/ileoscopy through stoma (N/A)    Final Anesthesia Type: general  Patient location during evaluation: PACU  Patient participation: Yes- Able to Participate  Level of consciousness: awake and alert  Post-procedure vital signs: reviewed and stable  Pain management: adequate  Airway patency: patent  PONV status at discharge: No PONV  Anesthetic complications: no      Cardiovascular status: blood pressure returned to baseline  Respiratory status: unassisted and room air  Hydration status: euvolemic  Follow-up not needed.          Vitals Value Taken Time   BP 91/52 5/23/2019  8:52 AM   Temp 36.5 °C (97.7 °F) 5/23/2019  8:52 AM   Pulse 60 5/23/2019  9:00 AM   Resp 16 5/23/2019  8:52 AM   SpO2 96 % 5/23/2019  9:00 AM   Vitals shown include unvalidated device data.      Event Time     Out of Recovery 05/23/2019 09:03:41          Pain/Mirta Score: Pain Rating Prior to Med Admin: 9 (5/23/2019  6:22 AM)  Pain Rating Post Med Admin: 6 (5/23/2019  2:56 AM)  Mirta Score: 10 (5/23/2019  8:55 AM)

## 2019-05-23 NOTE — ASSESSMENT & PLAN NOTE
-Mrs. Carvajal was placed in observation status.  -CT- Postsurgical changes of subtotal colectomy with left lower quadrant ileostomy.  No evidence of bowel obstruction.  Small amount of contrast seen tracking into the right lower abdomen in region of previously visualized abscess which may represent small contained perforation or sinus tract. Previously visualized abscess in this location has otherwise resolved.  Interval decrease size of small 1.5 cm hypodense collection within left lower abdomen region of previous abscess.  No organized drainable fluid collection or abscess seen within the abdomen at this time.  -She is afebrile.  Initial minimal leukocytosis resolved spontaneously.  Her ESR and CRP are normal.  She has no tacycardia.    -She was taken for ileoscopy on 5/22 which showed no evidence of active crohn's disease to 20cm.  They were unable to do colonoscopy due to anal stenosis.  -She was eating without difficulty so cleared for discharge home.  -I recommended a low residue diet but if there was any nausea at home recommended tiral of a liquid diet before coming into hospital.

## 2019-05-24 ENCOUNTER — NURSE TRIAGE (OUTPATIENT)
Dept: ADMINISTRATIVE | Facility: CLINIC | Age: 49
End: 2019-05-24

## 2019-05-24 NOTE — TELEPHONE ENCOUNTER
Reason for Disposition   Sounds like a life-threatening emergency to the triager    Protocols used: ST WEAKNESS (GENERALIZED) AND FATIGUE-A-OH  Just out of hosp yest for abd pain - doctors unsure as to why. Woke this am felt incredibly weak & shaky now can hardly move. getting incredibly nauseous. Still having abd pain. Rec EMS. call back with questions

## 2019-05-25 ENCOUNTER — HOSPITAL ENCOUNTER (INPATIENT)
Facility: HOSPITAL | Age: 49
LOS: 1 days | Discharge: HOME | DRG: 881 | End: 2019-05-25
Attending: PSYCHIATRY & NEUROLOGY | Admitting: PSYCHIATRY & NEUROLOGY
Payer: MEDICARE

## 2019-05-25 VITALS
TEMPERATURE: 98 F | BODY MASS INDEX: 19.69 KG/M2 | DIASTOLIC BLOOD PRESSURE: 57 MMHG | HEART RATE: 50 BPM | RESPIRATION RATE: 17 BRPM | HEIGHT: 60 IN | SYSTOLIC BLOOD PRESSURE: 105 MMHG | WEIGHT: 100.31 LBS

## 2019-05-25 DIAGNOSIS — R45.851 DEPRESSION WITH SUICIDAL IDEATION: ICD-10-CM

## 2019-05-25 DIAGNOSIS — Z72.0 TOBACCO USE: Primary | ICD-10-CM

## 2019-05-25 DIAGNOSIS — F32.A DEPRESSION WITH SUICIDAL IDEATION: ICD-10-CM

## 2019-05-25 LAB
FOLATE SERPL-MCNC: 3.6 NG/ML (ref 4–24)
HIV1+2 IGG SERPL QL IA.RAPID: NEGATIVE
RPR SER QL: NORMAL
T3 SERPL-MCNC: 58 NG/DL (ref 60–180)
T4 FREE SERPL-MCNC: 0.84 NG/DL (ref 0.71–1.51)
VIT B12 SERPL-MCNC: 150 PG/ML (ref 210–950)

## 2019-05-25 PROCEDURE — 86703 HIV-1/HIV-2 1 RESULT ANTBDY: CPT

## 2019-05-25 PROCEDURE — 86592 SYPHILIS TEST NON-TREP QUAL: CPT

## 2019-05-25 PROCEDURE — 82607 VITAMIN B-12: CPT

## 2019-05-25 PROCEDURE — 84439 ASSAY OF FREE THYROXINE: CPT

## 2019-05-25 PROCEDURE — S4991 NICOTINE PATCH NONLEGEND: HCPCS | Performed by: STUDENT IN AN ORGANIZED HEALTH CARE EDUCATION/TRAINING PROGRAM

## 2019-05-25 PROCEDURE — 84480 ASSAY TRIIODOTHYRONINE (T3): CPT

## 2019-05-25 PROCEDURE — 82746 ASSAY OF FOLIC ACID SERUM: CPT

## 2019-05-25 PROCEDURE — 36415 COLL VENOUS BLD VENIPUNCTURE: CPT

## 2019-05-25 PROCEDURE — 99222 PR INITIAL HOSPITAL CARE,LEVL II: ICD-10-PCS | Mod: AI,,, | Performed by: PSYCHIATRY & NEUROLOGY

## 2019-05-25 PROCEDURE — 25000003 PHARM REV CODE 250: Performed by: STUDENT IN AN ORGANIZED HEALTH CARE EDUCATION/TRAINING PROGRAM

## 2019-05-25 PROCEDURE — 99222 1ST HOSP IP/OBS MODERATE 55: CPT | Mod: AI,,, | Performed by: PSYCHIATRY & NEUROLOGY

## 2019-05-25 PROCEDURE — 12400001 HC PSYCH SEMI-PRIVATE ROOM

## 2019-05-25 RX ORDER — OXYCODONE HYDROCHLORIDE 5 MG/1
30 TABLET ORAL EVERY 6 HOURS PRN
Status: DISCONTINUED | OUTPATIENT
Start: 2019-05-25 | End: 2019-05-25 | Stop reason: HOSPADM

## 2019-05-25 RX ORDER — OLANZAPINE 10 MG/2ML
5 INJECTION, POWDER, FOR SOLUTION INTRAMUSCULAR 3 TIMES DAILY PRN
Status: DISCONTINUED | OUTPATIENT
Start: 2019-05-25 | End: 2019-05-25 | Stop reason: HOSPADM

## 2019-05-25 RX ORDER — HYDROXYZINE HYDROCHLORIDE 50 MG/1
50 TABLET, FILM COATED ORAL NIGHTLY PRN
Status: DISCONTINUED | OUTPATIENT
Start: 2019-05-25 | End: 2019-05-25 | Stop reason: HOSPADM

## 2019-05-25 RX ORDER — OLANZAPINE 5 MG/1
5 TABLET ORAL 3 TIMES DAILY PRN
Status: DISCONTINUED | OUTPATIENT
Start: 2019-05-25 | End: 2019-05-25 | Stop reason: HOSPADM

## 2019-05-25 RX ORDER — DULOXETIN HYDROCHLORIDE 60 MG/1
60 CAPSULE, DELAYED RELEASE ORAL 2 TIMES DAILY
Status: DISCONTINUED | OUTPATIENT
Start: 2019-05-25 | End: 2019-05-25 | Stop reason: HOSPADM

## 2019-05-25 RX ORDER — LOPERAMIDE HYDROCHLORIDE 2 MG/1
2 CAPSULE ORAL
Status: DISCONTINUED | OUTPATIENT
Start: 2019-05-25 | End: 2019-05-25 | Stop reason: HOSPADM

## 2019-05-25 RX ORDER — FOLIC ACID 1 MG/1
1 TABLET ORAL DAILY
Status: DISCONTINUED | OUTPATIENT
Start: 2019-05-25 | End: 2019-05-25 | Stop reason: HOSPADM

## 2019-05-25 RX ORDER — CEPHALEXIN 500 MG/1
500 CAPSULE ORAL EVERY 12 HOURS
Status: DISCONTINUED | OUTPATIENT
Start: 2019-05-25 | End: 2019-05-25 | Stop reason: HOSPADM

## 2019-05-25 RX ORDER — IBUPROFEN 400 MG/1
400 TABLET ORAL EVERY 6 HOURS PRN
Status: DISCONTINUED | OUTPATIENT
Start: 2019-05-25 | End: 2019-05-25 | Stop reason: HOSPADM

## 2019-05-25 RX ORDER — IBUPROFEN 200 MG
1 TABLET ORAL DAILY
Status: DISCONTINUED | OUTPATIENT
Start: 2019-05-25 | End: 2019-05-25 | Stop reason: HOSPADM

## 2019-05-25 RX ORDER — DOCUSATE SODIUM 100 MG/1
100 CAPSULE, LIQUID FILLED ORAL DAILY PRN
Status: DISCONTINUED | OUTPATIENT
Start: 2019-05-25 | End: 2019-05-25 | Stop reason: HOSPADM

## 2019-05-25 RX ORDER — PANTOPRAZOLE SODIUM 40 MG/1
40 TABLET, DELAYED RELEASE ORAL DAILY
Status: DISCONTINUED | OUTPATIENT
Start: 2019-05-25 | End: 2019-05-25 | Stop reason: HOSPADM

## 2019-05-25 RX ORDER — RAMELTEON 8 MG/1
8 TABLET ORAL NIGHTLY PRN
Status: DISCONTINUED | OUTPATIENT
Start: 2019-05-25 | End: 2019-05-25 | Stop reason: HOSPADM

## 2019-05-25 RX ADMIN — PANTOPRAZOLE SODIUM 40 MG: 40 TABLET, DELAYED RELEASE ORAL at 08:05

## 2019-05-25 RX ADMIN — Medication 1 PATCH: at 08:05

## 2019-05-25 RX ADMIN — DULOXETINE 60 MG: 60 CAPSULE, DELAYED RELEASE ORAL at 08:05

## 2019-05-25 RX ADMIN — FOLIC ACID 1 MG: 1 TABLET ORAL at 08:05

## 2019-05-25 RX ADMIN — THERA TABS 1 TABLET: TAB at 08:05

## 2019-05-25 RX ADMIN — CEPHALEXIN 500 MG: 500 CAPSULE ORAL at 08:05

## 2019-05-25 NOTE — ASSESSMENT & PLAN NOTE
- continue scheduled oxycontin 30 mg q 6 hours  - pt reports she has not been getting the liquid oxycontin 20 mg this month
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
## 2019-05-25 NOTE — HOSPITAL COURSE
"5/25/2019   Chart reviewed. Patient has been medication compliant, no medication side-effects reported. Received no psychiatric PRNs overnight.    Upon my evaluation, patient's thought process is linear, organized, she is alert and oriented x 4 with full range of affect. Mood is "good." Patient is angry that she was admitted into an inpatient psychiatric unit. States she was never suicidal. Patient is frustrated with her current medical condition, as she frequently experiences abdominal pain. Tolerated breakfast. Per chart review, if patient experiences nausea with solid food diet, switch to liquid. Denies SI, HI, AVH. No objective signs of psychosis, alexandra or depression with SI. Reports sleeping and eating well. Patient is future oriented, states suicide is against her Druze.       "
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
## 2019-05-25 NOTE — SUBJECTIVE & OBJECTIVE
"Interval History: see hospital course    Family History     None        Tobacco Use    Smoking status: Current Every Day Smoker     Packs/day: 0.25     Types: Cigarettes    Smokeless tobacco: Current User   Substance and Sexual Activity    Alcohol use: Never     Frequency: Never    Drug use: Never    Sexual activity: Not Currently     Partners: Male     Psychotherapeutics (From admission, onward)    Start     Stop Route Frequency Ordered    05/25/19 0900  DULoxetine DR capsule 60 mg      -- Oral 2 times daily 05/25/19 0543 05/25/19 0543  OLANZapine tablet 5 mg  (Olanzapine)      -- Oral 3 times daily PRN 05/25/19 0543    05/25/19 0543  OLANZapine injection 5 mg  (Olanzapine)      -- IM 3 times daily PRN 05/25/19 0543    05/25/19 0543  ramelteon tablet 8 mg      -- Oral Nightly PRN 05/25/19 0543           Review of Systems  Objective:     Vital Signs (Most Recent):  Temp: 97.5 °F (36.4 °C) (05/25/19 0735)  Pulse: (!) 50 (05/25/19 0735)  Resp: 17 (05/25/19 0735)  BP: (!) 105/57 (05/25/19 0735) Vital Signs (24h Range):  Temp:  [97.5 °F (36.4 °C)-99.6 °F (37.6 °C)] 97.5 °F (36.4 °C)  Pulse:  [50-90] 50  Resp:  [16-20] 17  SpO2:  [97 %-99 %] 97 %  BP: (105-132)/(57-90) 105/57     Height: 5' (152.4 cm)  Weight: 45.5 kg (100 lb 5 oz)  Body mass index is 19.59 kg/m².    No intake or output data in the 24 hours ending 05/25/19 1204    Physical Exam     Mental Status Exam:  Appearance: unremarkable, age appropriate  Level of Consciousness: alert  Behavior/Cooperation: normal, cooperative  Psychomotor: unremarkable   Speech: normal tone, normal rate, normal pitch, normal volume  Orientation: grossly intact  Attention Span/Concentration: intact  Memory: Grossly intact  Mood: "fine"  Affect: normal and mood congruent  Thought Process: normal and logical  Associations: normal and logical  Thought Content: normal, no suicidality, no homicidality, delusions, or paranoia  Fund of Knowledge: Aware of current " events  Abstraction: proverbs were abstract  Insight: fair  Judgment: fair      Suicide Risk Assessment (if applicable)---PLEASE NOTE THIS IS NOT INDICATED FOR USE AS AN ASSESSMENT OF ACUTE RISK   A: Access to lethal means ? Y/N  Risk Factors:  · S: Male sex ? 0  · A: Age 15-25 or 59+ years ? 0  · D: Depression or hopelessness ? 0  · P: Previous suicidal attempts or psychiatric care ? 0  · E: Excessive ethanol or drug use ? 0  · R: Rational thinking loss (psychotic or organic illness) ? 0  · S: Single,  or  ? +1   · O: Organized or serious attempt ? 0  · N: No social support ? +1   · S: Stated future intent (determined to repeat or ambivalent) ? 0  Protective Factors:   C: Contracts for safety ? -1   H: Hopeful for the future ? -1   O: Oriented to the future ? -1    I:  Intent- denies ? -1   R: Reasons not to attempt (namely, impact on loved ones and Denominational) ? -1  This score is then mapped onto a risk assessment scale as follows:  · 0-5: May be safe to discharge (depending upon circumstances)     Significant Labs:   Last 72 Hours:   Recent Lab Results  (Last 5 results in the past 72 hours)      05/25/19  0550   05/24/19  2146   05/24/19  1626   05/23/19  0556   05/22/19  1645        Benzodiazepines   Negative           Methadone metabolites   Negative           Phencyclidine   Negative           Albumin     3.4 3.5       Alkaline Phosphatase     79 71       ALT     5 6       Amphetamine Screen, Ur   Negative           Anion Gap     9 9       Appearance, UA   Hazy           AST     10 10       Bacteria, UA   Occasional           Barbiturate Screen, Ur   Negative           Baso #     0.08 0.08       Basophil%     0.7 0.9       Bilirubin (UA)   Negative           BILIRUBIN TOTAL     0.2  Comment:  For infants and newborns, interpretation of results should be based  on gestational age, weight and in agreement with clinical  observations.  Premature Infant recommended reference ranges:  Up to 24  hours.............<8.0 mg/dL  Up to 48 hours............<12.0 mg/dL  3-5 days..................<15.0 mg/dL  6-29 days.................<15.0 mg/dL   0.4  Comment:  For infants and newborns, interpretation of results should be based  on gestational age, weight and in agreement with clinical  observations.  Premature Infant recommended reference ranges:  Up to 24 hours.............<8.0 mg/dL  Up to 48 hours............<12.0 mg/dL  3-5 days..................<15.0 mg/dL  6-29 days.................<15.0 mg/dL         BUN, Bld     8 7       Calcium     9.1 9.1       Chloride     112 108       CO2     22 20       Cocaine (Metab.)   Negative           Color, UA   Yellow           Creatinine     0.9 0.8       Creatinine, Random Ur   168.0  Comment:  The random urine reference ranges provided were established   for 24 hour urine collections.  No reference ranges exist for  random urine specimens.  Correlate clinically.             CRP, High Sensitivity         1.03     Differential Method     Automated Automated       eGFR if      >60.0 >60       eGFR if non      >60.0  Comment:  Calculation used to obtain the estimated glomerular filtration  rate (eGFR) is the CKD-EPI equation.    >60  Comment:  Calculation used to obtain the estimated glomerular filtration  rate (eGFR) is the CKD-EPI equation.          Eos #     0.1 0.2       Eosinophil%     0.7 2.3       Folate 3.6             Free T4 0.84             Glucose     110 75       Glucose, UA   Negative           Gran # (ANC)     8.5 5.6       Gran%     79.5 64.2       Hematocrit     40.6 40.1       Hemoglobin     13.1 13.0       HIV Rapid Testing Negative             Immature Grans (Abs)     0.04  Comment:  Mild elevation in immature granulocytes is non specific and   can be seen in a variety of conditions including stress response,   acute inflammation, trauma and pregnancy. Correlation with other   laboratory and clinical findings is  essential.           Immature Granulocytes     0.4         Ketones, UA   Negative           Leukocytes, UA   2+           Lymph #     1.5 2.4       Lymph%     13.6 27.1       Magnesium       2.2       MCH     32.5 32.2       MCHC     32.3 32.4       MCV     101 99       Microscopic Comment   SEE COMMENT  Comment:  Other formed elements not mentioned in the report are not   present in the microscopic examination.              Mono #     0.5 0.5       Mono%     5.1 5.4       MPV     10.6 10.1       NITRITE UA   Negative           nRBC     0         Occult Blood UA   1+           Opiate Scrn, Ur   Presumptive Positive           pH, UA   6.0           Platelets     240 306       Potassium     3.9 3.8       PROTEIN TOTAL     7.1 6.9       Protein, UA   Negative  Comment:  Recommend a 24 hour urine protein or a urine   protein/creatinine ratio if globulin induced proteinuria is  clinically suspected.             RBC     4.03 4.04       RBC, UA   22           RDW     14.7 14.8       Sed Rate         20     Sodium     143 137       Specific Gravity, UA   >1.030           Specimen UA   Urine, Clean Catch           Squam Epithel, UA   15           T3, Total 58             Marijuana (THC) Metabolite   Negative           Toxicology Information   SEE COMMENT  Comment:  This screen includes the following classes of drugs at the   listed cut-off:  Benzodiazepines                  200 ng/ml  Methadone                        300 ng/ml  Cocaine metabolite               300 ng/ml  Opiates                          300 ng/ml  Barbiturates                     200 ng/ml  Amphetamines                    1000 ng/ml  Marijuana metabs (THC)            50 ng/ml  Phencyclidine (PCP)               25 ng/ml  High concentrations of Diphenhydramine may cross-react with  Phencyclidine PCP screening immunoassay giving a false   positive result.  High concentrations of Methylenedioxymethamphetamine (MDMA aka  Ectasy) and other structurally similar  compounds may cross-   react with the Amphetamine/Methamphetamine screening   immunoassay giving a false positive result.  A metabolite of the anti-HIV drug Sustiva () may cause  false positive results in the Marijuana metabolite (THC)   screening assay.  Note: This exception list includes only more common   interferants in toxicology screen testing.  Because of many   cross-reactantspositive results on toxicology drug screens   should be confirmed whenever results do not correlate with   clinical presentation.  This report is intended for use in clinical monitoring and  management of patients. It is not intended for use in   employment related drug testing.  Because of any cross-reactants, positive results on toxicology  drug screens should be confirmed whenever results do not  correlate with clinical presentation.  Presumptive positive results are unconfirmed and may be used   only for medical purposes.  Assay Intended Use: This asasy provides only a preliminary analytical  test result. A more specific alternate chemical method must be used  to obtain a confirmed analytical result. Gas chromatography/mass  spectrometry (GS/MS)is the preferred confirmatory method. Clinical  consideration and professional judgement should be applied to any   drug of abuse test result, particularly when preliminary results  are used.             TSH     0.683         Vitamin B-12 150             WBC, UA   51           WBC     10.69 8.72                            Significant Imaging: I have reviewed all pertinent imaging results/findings within the past 24 hours.
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
## 2019-05-25 NOTE — SUBJECTIVE & OBJECTIVE
Patient History           Medical as of 5/25/2019     Past Medical History     Diagnosis Date Comments Source    Arthritis -- -- Provider    Crohn disease -- -- Provider    Fibromyalgia -- -- Provider                  Surgical as of 5/25/2019     Past Surgical History     Procedure Laterality Date Comments Source    ILEOSTOMY -- -- -- Provider    COLON SURGERY -- -- -- Provider    ABDOMINAL SURGERY -- -- -- Provider    COLONOSCOPY N/A 5/23/2019 Procedure: COLONOSCOPY/ileoscopy through stoma;  Surgeon: Santiago Sims MD;  Location: [facility];  Service: Endoscopy;  Laterality: N/A; Provider                  Family as of 5/25/2019    None           Tobacco Use as of 5/25/2019     Smoking Status Smoking Start Date Smoking Quit Date Packs/Day Years Used    Current Every Day Smoker -- -- 0.25 --    Types Comments Smokeless Tobacco Status Smokeless Tobacco Quit Date Source     Cigarettes -- Current User -- Provider            Alcohol Use as of 5/25/2019     Alcohol Use Drinks/Week Alcohol/Week Comments Source    Never -- -- -- Provider    Frequency Standard Drinks Binge Drinking        Never -- --              Drug Use as of 5/25/2019     Drug Use Types Frequency Comments Source    Never -- -- -- Provider            Sexual Activity as of 5/25/2019     Sexually Active Birth Control Partners Comments Source    -- -- -- -- Provider            Activities of Daily Living as of 5/25/2019    None           Social Documentation as of 5/25/2019    None           Occupational as of 5/25/2019    None           Socioeconomic as of 5/25/2019     Marital Status Spouse Name Number of Children Years Education Education Level Preferred Language Ethnicity Race Source    Single -- -- -- -- English /White White --    Financial Resource Strain Food Insecurity: Worry Food Insecurity: Inability Transportation Needs: Medical Transportation Needs: Non-medical    -- -- -- -- --            Pertinent History     Question Response  Comments    Lives with -- --    Place in Birth Order -- --    Lives in -- --    Number of Siblings -- --    Raised by -- --    Legal Involvement -- --    Childhood Trauma -- --    Criminal History of -- --    Financial Status -- --    Highest Level of Education -- --    Does patient have access to a firearm? -- --     Service -- --    Primary Leisure Activity -- --    Spirituality -- --        Past Medical History:   Diagnosis Date    Arthritis     Crohn disease     Fibromyalgia      Past Surgical History:   Procedure Laterality Date    ABDOMINAL SURGERY      COLON SURGERY      COLONOSCOPY/ileoscopy through stoma N/A 5/23/2019    [facility]    ILEOSTOMY       Family History     None        Tobacco Use    Smoking status: Current Every Day Smoker     Packs/day: 0.25     Types: Cigarettes    Smokeless tobacco: Current User   Substance and Sexual Activity    Alcohol use: Never     Frequency: Never    Drug use: Never    Sexual activity: Not on file     Review of patient's allergies indicates:   Allergen Reactions    Imuran [azathioprine sodium] Other (See Comments)     pancreatitis    Penicillins Hives    Sulfa (sulfonamide antibiotics) Nausea Only and Rash       Current Facility-Administered Medications on File Prior to Encounter   Medication    [COMPLETED] cefTRIAXone injection 1 g    [COMPLETED] iohexol (OMNIPAQUE 350) injection 75 mL    [COMPLETED] morphine injection 2 mg    [COMPLETED] morphine tablet 15 mg    [COMPLETED] ondansetron injection 4 mg    [COMPLETED] oxyCODONE-acetaminophen 5-325 mg per tablet 1 tablet    [COMPLETED] promethazine (PHENERGAN) 12.5 mg in dextrose 5 % 50 mL IVPB    [COMPLETED] sodium chloride 0.9% bolus 1,000 mL    [COMPLETED] sodium chloride 0.9% bolus 1,000 mL     Current Outpatient Medications on File Prior to Encounter   Medication Sig    cephALEXin (KEFLEX) 500 MG capsule Take 1 capsule (500 mg total) by mouth every 12  (twelve) hours. for 5 days    duloxetine (CYMBALTA) 60 MG capsule Take 60 mg by mouth 2 (two) times daily.     folic acid (FOLVITE) 1 MG tablet Take 1 mg by mouth once daily.    oxycodone (ROXICODONE) 30 MG Tab Take 30 mg by mouth every 6 (six) hours as needed.     oxycodone 20 mg/ml oral conc (ROXICODONE INTENSOL) 20 mg/mL concentrated solution Take 20 mg by mouth 4 (four) times daily as needed for Pain.     pantoprazole (PROTONIX) 40 MG tablet Take 40 mg by mouth once daily.    QUEtiapine (SEROQUEL) 100 MG Tab Take 100 mg by mouth.     [DISCONTINUED] dicyclomine (BENTYL) 20 mg tablet Take 1 tablet (20 mg total) by mouth 2 (two) times daily.    [DISCONTINUED] promethazine (PHENERGAN) 25 MG tablet Take 1 tablet (25 mg total) by mouth every 6 (six) hours as needed for Nausea.     Psychotherapeutics (From admission, onward)    None        Review of Systems  Strengths and Liabilities: Strength: Patient has reasonable judgment.    Objective:     Vital Signs (Most Recent):    Vital Signs (24h Range):  Temp:  [99.6 °F (37.6 °C)] 99.6 °F (37.6 °C)  Pulse:  [67-90] 67  Resp:  [16-20] 20  SpO2:  [97 %-99 %] 97 %  BP: (105-132)/(67-90) 105/67           There is no height or weight on file to calculate BMI.      Intake/Output Summary (Last 24 hours) at 5/25/2019 0201  Last data filed at 5/24/2019 2146  Gross per 24 hour   Intake 50 ml   Output --   Net 50 ml       Physical Exam   Psychiatric:   Mental Status Exam:  Appearance:  unremarkable, age appropriate  Behavior:       normal, cooperative  Speech/Language:    normal tone, normal rate, normal pitch, normal volume  Mood: sad  Affect: constricted  Thought Process:     normal and logical  Thought Content:      normal, passive suicidality, no homicidality, delusions, or paranoia   Orientation:    grossly intact  Cognition:      grossly intact  Insight:           fair  Judgment:      fair        Significant Labs:   Last 24 Hours:   Recent Lab Results        05/24/19  2146   05/24/19  1626        Benzodiazepines Negative       Methadone metabolites Negative       Phencyclidine Negative       Albumin   3.4     Alkaline Phosphatase   79     ALT   5     Amphetamine Screen, Ur Negative       Anion Gap   9     Appearance, UA Hazy       AST   10     Bacteria, UA Occasional       Barbiturate Screen, Ur Negative       Baso #   0.08     Basophil%   0.7     Bilirubin (UA) Negative       BILIRUBIN TOTAL   0.2  Comment:  For infants and newborns, interpretation of results should be based  on gestational age, weight and in agreement with clinical  observations.  Premature Infant recommended reference ranges:  Up to 24 hours.............<8.0 mg/dL  Up to 48 hours............<12.0 mg/dL  3-5 days..................<15.0 mg/dL  6-29 days.................<15.0 mg/dL       BUN, Bld   8     Calcium   9.1     Chloride   112     CO2   22     Cocaine (Metab.) Negative       Color, UA Yellow       Creatinine   0.9     Creatinine, Random Ur 168.0  Comment:  The random urine reference ranges provided were established   for 24 hour urine collections.  No reference ranges exist for  random urine specimens.  Correlate clinically.         Differential Method   Automated     eGFR if    >60.0     eGFR if non    >60.0  Comment:  Calculation used to obtain the estimated glomerular filtration  rate (eGFR) is the CKD-EPI equation.        Eos #   0.1     Eosinophil%   0.7     Glucose   110     Glucose, UA Negative       Gran # (ANC)   8.5     Gran%   79.5     Hematocrit   40.6     Hemoglobin   13.1     Immature Grans (Abs)   0.04  Comment:  Mild elevation in immature granulocytes is non specific and   can be seen in a variety of conditions including stress response,   acute inflammation, trauma and pregnancy. Correlation with other   laboratory and clinical findings is essential.       Immature Granulocytes   0.4     Ketones, UA Negative       Leukocytes, UA 2+       Lymph #    1.5     Lymph%   13.6     MCH   32.5     MCHC   32.3     MCV   101     Microscopic Comment SEE COMMENT  Comment:  Other formed elements not mentioned in the report are not   present in the microscopic examination.          Mono #   0.5     Mono%   5.1     MPV   10.6     NITRITE UA Negative       nRBC   0     Occult Blood UA 1+       Opiate Scrn, Ur Presumptive Positive       pH, UA 6.0       Platelets   240     Potassium   3.9     PROTEIN TOTAL   7.1     Protein, UA Negative  Comment:  Recommend a 24 hour urine protein or a urine   protein/creatinine ratio if globulin induced proteinuria is  clinically suspected.         RBC   4.03     RBC, UA 22       RDW   14.7     Sodium   143     Specific Gravity, UA >1.030       Specimen UA Urine, Clean Catch       Squam Epithel, UA 15       Marijuana (THC) Metabolite Negative       Toxicology Information SEE COMMENT  Comment:  This screen includes the following classes of drugs at the   listed cut-off:  Benzodiazepines                  200 ng/ml  Methadone                        300 ng/ml  Cocaine metabolite               300 ng/ml  Opiates                          300 ng/ml  Barbiturates                     200 ng/ml  Amphetamines                    1000 ng/ml  Marijuana metabs (THC)            50 ng/ml  Phencyclidine (PCP)               25 ng/ml  High concentrations of Diphenhydramine may cross-react with  Phencyclidine PCP screening immunoassay giving a false   positive result.  High concentrations of Methylenedioxymethamphetamine (MDMA aka  Ectasy) and other structurally similar compounds may cross-   react with the Amphetamine/Methamphetamine screening   immunoassay giving a false positive result.  A metabolite of the anti-HIV drug Sustiva () may cause  false positive results in the Marijuana metabolite (THC)   screening assay.  Note: This exception list includes only more common   interferants in toxicology screen testing.  Because of many    cross-reactantspositive results on toxicology drug screens   should be confirmed whenever results do not correlate with   clinical presentation.  This report is intended for use in clinical monitoring and  management of patients. It is not intended for use in   employment related drug testing.  Because of any cross-reactants, positive results on toxicology  drug screens should be confirmed whenever results do not  correlate with clinical presentation.  Presumptive positive results are unconfirmed and may be used   only for medical purposes.  Assay Intended Use: This asasy provides only a preliminary analytical  test result. A more specific alternate chemical method must be used  to obtain a confirmed analytical result. Gas chromatography/mass  spectrometry (GS/MS)is the preferred confirmatory method. Clinical  consideration and professional judgement should be applied to any   drug of abuse test result, particularly when preliminary results  are used.         TSH   0.683     WBC, UA 51       WBC   10.69           Significant Imaging: None
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
## 2019-05-25 NOTE — PROGRESS NOTES
05/25/19 0830 05/25/19 0930 05/25/19 1030   [facility] Group Therapy   Group Name Community Reintegration Mental Awareness Therapeutic Recreation   Specific Interventions Current Events Skilled Activity Self-Expression  (emotion can) Cognitive Stimulation Training  (pictionary)   Participation Level Active;Appropriate Active;Appropriate Active;Appropriate   Participation Quality Cooperative Cooperative Cooperative   Insight/Motivation Good Good Good   Affect/Mood Display Appropriate Appropriate Appropriate   Cognition Alert;Oriented Alert;Oriented Alert;Oriented      05/25/19 1230   [facility] Group Therapy   Group Name Therapeutic Recreation   Specific Interventions Skilled Activity Mild Exercises  (bean bag toss)   Participation Level Active;Appropriate   Participation Quality Cooperative   Insight/Motivation Good   Affect/Mood Display Appropriate   Cognition Alert;Oriented

## 2019-07-07 ENCOUNTER — HOSPITAL ENCOUNTER (EMERGENCY)
Facility: OTHER | Age: 49
Discharge: HOME | End: 2019-07-08
Attending: EMERGENCY MEDICINE
Payer: MEDICARE

## 2019-07-07 DIAGNOSIS — K43.5 PARASTOMAL HERNIA WITHOUT OBSTRUCTION OR GANGRENE: ICD-10-CM

## 2019-07-07 DIAGNOSIS — R10.32 LLQ ABDOMINAL PAIN: Primary | ICD-10-CM

## 2019-07-07 PROCEDURE — 96376 TX/PRO/DX INJ SAME DRUG ADON: CPT

## 2019-07-07 PROCEDURE — 96361 HYDRATE IV INFUSION ADD-ON: CPT

## 2019-07-07 PROCEDURE — 96374 THER/PROPH/DIAG INJ IV PUSH: CPT

## 2019-07-07 PROCEDURE — 99285 EMERGENCY DEPT VISIT HI MDM: CPT | Mod: 25

## 2019-07-07 RX ORDER — MORPHINE SULFATE 4 MG/ML
4 INJECTION, SOLUTION INTRAMUSCULAR; INTRAVENOUS
Status: COMPLETED | OUTPATIENT
Start: 2019-07-08 | End: 2019-07-08

## 2019-07-08 VITALS
DIASTOLIC BLOOD PRESSURE: 78 MMHG | HEIGHT: 60 IN | HEART RATE: 60 BPM | OXYGEN SATURATION: 98 % | WEIGHT: 107 LBS | SYSTOLIC BLOOD PRESSURE: 123 MMHG | RESPIRATION RATE: 18 BRPM | BODY MASS INDEX: 21.01 KG/M2 | TEMPERATURE: 99 F

## 2019-07-08 LAB
ALBUMIN SERPL BCP-MCNC: 3.4 G/DL (ref 3.5–5.2)
ALP SERPL-CCNC: 99 U/L (ref 55–135)
ALT SERPL W/O P-5'-P-CCNC: 9 U/L (ref 10–44)
ANION GAP SERPL CALC-SCNC: 10 MMOL/L (ref 8–16)
AST SERPL-CCNC: 14 U/L (ref 10–40)
B-HCG UR QL: NEGATIVE
BACTERIA #/AREA URNS HPF: ABNORMAL /HPF
BASOPHILS # BLD AUTO: 0.03 K/UL (ref 0–0.2)
BASOPHILS NFR BLD: 0.3 % (ref 0–1.9)
BILIRUB SERPL-MCNC: 0.1 MG/DL (ref 0.1–1)
BILIRUB UR QL STRIP: NEGATIVE
BUN SERPL-MCNC: 8 MG/DL (ref 6–20)
CALCIUM SERPL-MCNC: 9.3 MG/DL (ref 8.7–10.5)
CHLORIDE SERPL-SCNC: 106 MMOL/L (ref 95–110)
CLARITY UR: ABNORMAL
CO2 SERPL-SCNC: 26 MMOL/L (ref 23–29)
COLOR UR: YELLOW
CREAT SERPL-MCNC: 0.8 MG/DL (ref 0.5–1.4)
CTP QC/QA: YES
DIFFERENTIAL METHOD: ABNORMAL
EOSINOPHIL # BLD AUTO: 0.1 K/UL (ref 0–0.5)
EOSINOPHIL NFR BLD: 1.3 % (ref 0–8)
ERYTHROCYTE [DISTWIDTH] IN BLOOD BY AUTOMATED COUNT: 14.2 % (ref 11.5–14.5)
EST. GFR  (AFRICAN AMERICAN): >60 ML/MIN/1.73 M^2
EST. GFR  (NON AFRICAN AMERICAN): >60 ML/MIN/1.73 M^2
GLUCOSE SERPL-MCNC: 109 MG/DL (ref 70–110)
GLUCOSE UR QL STRIP: NEGATIVE
HCT VFR BLD AUTO: 38.1 % (ref 37–48.5)
HGB BLD-MCNC: 12.2 G/DL (ref 12–16)
HGB UR QL STRIP: ABNORMAL
HYALINE CASTS #/AREA URNS LPF: 8 /LPF
KETONES UR QL STRIP: NEGATIVE
LEUKOCYTE ESTERASE UR QL STRIP: ABNORMAL
LIPASE SERPL-CCNC: 24 U/L (ref 4–60)
LYMPHOCYTES # BLD AUTO: 2.3 K/UL (ref 1–4.8)
LYMPHOCYTES NFR BLD: 21.6 % (ref 18–48)
MCH RBC QN AUTO: 31.8 PG (ref 27–31)
MCHC RBC AUTO-ENTMCNC: 32 G/DL (ref 32–36)
MCV RBC AUTO: 99 FL (ref 82–98)
MICROSCOPIC COMMENT: ABNORMAL
MONOCYTES # BLD AUTO: 0.7 K/UL (ref 0.3–1)
MONOCYTES NFR BLD: 6.8 % (ref 4–15)
NEUTROPHILS # BLD AUTO: 7.3 K/UL (ref 1.8–7.7)
NEUTROPHILS NFR BLD: 70 % (ref 38–73)
NITRITE UR QL STRIP: POSITIVE
PH UR STRIP: 6 [PH] (ref 5–8)
PLATELET # BLD AUTO: 344 K/UL (ref 150–350)
PMV BLD AUTO: 9.4 FL (ref 9.2–12.9)
POTASSIUM SERPL-SCNC: 3.8 MMOL/L (ref 3.5–5.1)
PROT SERPL-MCNC: 7 G/DL (ref 6–8.4)
PROT UR QL STRIP: ABNORMAL
RBC # BLD AUTO: 3.84 M/UL (ref 4–5.4)
RBC #/AREA URNS HPF: 5 /HPF (ref 0–4)
SODIUM SERPL-SCNC: 142 MMOL/L (ref 136–145)
SP GR UR STRIP: >=1.03 (ref 1–1.03)
URN SPEC COLLECT METH UR: ABNORMAL
UROBILINOGEN UR STRIP-ACNC: NEGATIVE EU/DL
WBC # BLD AUTO: 10.48 K/UL (ref 3.9–12.7)
WBC #/AREA URNS HPF: >100 /HPF (ref 0–5)

## 2019-07-08 PROCEDURE — 81025 URINE PREGNANCY TEST: CPT | Performed by: EMERGENCY MEDICINE

## 2019-07-08 PROCEDURE — 85025 COMPLETE CBC W/AUTO DIFF WBC: CPT

## 2019-07-08 PROCEDURE — 81000 URINALYSIS NONAUTO W/SCOPE: CPT

## 2019-07-08 PROCEDURE — 80053 COMPREHEN METABOLIC PANEL: CPT

## 2019-07-08 PROCEDURE — 87086 URINE CULTURE/COLONY COUNT: CPT

## 2019-07-08 PROCEDURE — 63600175 PHARM REV CODE 636 W HCPCS: Performed by: EMERGENCY MEDICINE

## 2019-07-08 PROCEDURE — 83690 ASSAY OF LIPASE: CPT

## 2019-07-08 PROCEDURE — 63600175 PHARM REV CODE 636 W HCPCS: Performed by: NURSE PRACTITIONER

## 2019-07-08 PROCEDURE — 25000003 PHARM REV CODE 250: Performed by: NURSE PRACTITIONER

## 2019-07-08 PROCEDURE — 25500020 PHARM REV CODE 255: Performed by: EMERGENCY MEDICINE

## 2019-07-08 RX ORDER — MORPHINE SULFATE 4 MG/ML
4 INJECTION, SOLUTION INTRAMUSCULAR; INTRAVENOUS
Status: COMPLETED | OUTPATIENT
Start: 2019-07-08 | End: 2019-07-08

## 2019-07-08 RX ADMIN — IOHEXOL 75 ML: 350 INJECTION, SOLUTION INTRAVENOUS at 02:07

## 2019-07-08 RX ADMIN — MORPHINE SULFATE 4 MG: 4 INJECTION INTRAVENOUS at 01:07

## 2019-07-08 RX ADMIN — SODIUM CHLORIDE 1000 ML: 0.9 INJECTION, SOLUTION INTRAVENOUS at 01:07

## 2019-07-08 RX ADMIN — MORPHINE SULFATE 4 MG: 4 INJECTION INTRAVENOUS at 03:07

## 2019-07-08 NOTE — ED NOTES
Pt resting w/ eyes closed and in no distress. VSS. Pt is stable, will continue to monitor closely.

## 2019-07-08 NOTE — ED NOTES
Pt sitting up in bed A & O x 3, watching TV and in no obvious distress. Respirations are even and unlabored. VS. Will continue to monitor closely.

## 2019-07-08 NOTE — ED NOTES
Pt sitting in bed watching TV, has no additional complaint. No respiratory distress observed. VSS. Will continue to monitor closely.

## 2019-07-08 NOTE — ED PROVIDER NOTES
Encounter Date: 7/7/2019       History     Chief Complaint   Patient presents with    Abdominal Pain     c/o LLQ abd pain with nausea x18 hours pta, seen here recently for similar symptoms, hx Crohns      Pt is a 50 yo female with medical history of arthritis, Crohns disease, depression fibromyalgia presenting to the ED for LLQ abdominal pain under her ostomy site.  Pt describes the pain as a sharp burning pain.  Pt states pain started yesterday.  Pt states decreased PO intake due to pain.  Pt states she has taken her home medications with no relief of pain.  Pt denies any vomiting, change in stool, blood in ostomy, fever or chills.      The history is provided by the patient and medical records.     Review of patient's allergies indicates:   Allergen Reactions    Imuran [azathioprine sodium] Other (See Comments)     pancreatitis    Penicillins Hives    Sulfa (sulfonamide antibiotics) Nausea Only and Rash     Past Medical History:   Diagnosis Date    Arthritis     Crohn disease     Depression     Fibromyalgia     Hx of psychiatric care      Past Surgical History:   Procedure Laterality Date    ABDOMINAL SURGERY      COLON SURGERY      COLONOSCOPY/ileoscopy through stoma N/A 5/23/2019    [facility]formed [facility] at [facility]    ILEOSTOMY       No family history on file.  Social History     Tobacco Use    Smoking status: Current Every Day Smoker     Packs/day: 0.25     Types: Cigarettes    Smokeless tobacco: Current User   Substance Use Topics    Alcohol use: Never     Frequency: Never    Drug use: Never     Review of Systems   Constitutional: Negative for activity change, appetite change, chills, fatigue and fever.   HENT: Negative for sore throat.    Respiratory: Negative for cough, shortness of breath and wheezing.    Cardiovascular: Negative for chest pain.   Gastrointestinal: Positive for abdominal pain (LLQ) and nausea. Negative for blood in stool, constipation, diarrhea and vomiting.    Genitourinary: Negative for decreased urine volume, difficulty urinating, dysuria, frequency and urgency.   Musculoskeletal: Negative for arthralgias, back pain and myalgias.   Skin: Positive for wound ( Ostomy to LLQ). Negative for rash.   Neurological: Negative for dizziness, syncope, weakness, numbness and headaches.   Hematological: Does not bruise/bleed easily.   All other systems reviewed and are negative.      Physical Exam     Initial Vitals [07/07/19 2206]   BP Pulse Resp Temp SpO2   123/74 99 18 98.5 °F (36.9 °C) 99 %      MAP       --         Physical Exam    Nursing note and vitals reviewed.  Constitutional: Vital signs are normal. She appears well-developed and well-nourished. She is cooperative. She does not have a sickly appearance. She does not appear ill. No distress.   HENT:   Head: Normocephalic and atraumatic.   Nose: Nose normal.   Mouth/Throat: Uvula is midline, oropharynx is clear and moist and mucous membranes are normal.   Eyes: Conjunctivae, EOM and lids are normal. Pupils are equal, round, and reactive to light.   Neck: Trachea normal, normal range of motion, full passive range of motion without pain and phonation normal. Neck supple. No spinous process tenderness and no muscular tenderness present. No JVD present.   Cardiovascular: Normal rate, regular rhythm and intact distal pulses.   Pulses:       Radial pulses are 2+ on the right side, and 2+ on the left side.   Pulmonary/Chest: Effort normal and breath sounds normal. She has no wheezes. She has no rhonchi.   Abdominal: Soft. Normal appearance and bowel sounds are normal. She exhibits no distension. There is tenderness in the left lower quadrant. There is no rigidity, no rebound, no guarding and no CVA tenderness. No hernia.       Neurological: She is alert and oriented to person, place, and time. She has normal strength. No sensory deficit. GCS eye subscore is 4. GCS verbal subscore is 5. GCS motor subscore is 6.   Skin: Skin is  warm and dry. Capillary refill takes 2 to 3 seconds. No abrasion, no laceration and no rash noted. There is pallor.   Psychiatric: She has a normal mood and affect.         ED Course   Procedures  Labs Reviewed   CBC W/ AUTO DIFFERENTIAL   COMPREHENSIVE METABOLIC PANEL   LIPASE   URINALYSIS, REFLEX TO URINE CULTURE          Imaging Results    None          Medical Decision Making:   History:   Old Medical Records: I decided to obtain old medical records.  Old Records Summarized: records from previous admission(s).  Initial Assessment:   Emergent evaluation of a 48 yo female patient presenting to the ER with chief complaint of left lower quadrant abdominal pain under her ostomy site.  Patient states pain is a burning stabbing sensation.  On exam patient is A&O x3.  Cap refill greater than 3 sec.  Patient is pale on exam.  Breath sounds clear bilaterally.  Abdomen soft with tenderness noted in left lower quadrant.  Bowel sounds within normal limits.  Strength 5/5 in all extremities.  Stoma beefy and red.  Brown stool noted in bag.  No blood noted..      Differential Diagnosis:   Differential diagnoses include but are not limited to diverticulitis, gastroenteritis, colitis, irritable bowel syndrome, urinary tract infection, hernia, ovarian cyst, ovarian torsion, intestinal obstruction, others.  Clinical Tests:   Lab Tests: Ordered and Reviewed  The following lab test(s) were unremarkable: CBC, CMP and Urinalysis  Radiological Study: Ordered and Reviewed  ED Management:  I will get labs, imaging, medicate, hydrate and reassess.    Patient signed out to Dr. Yeh pending labs and imaging results.                Attending Attestation:     Physician Attestation Statement for NP/PA:   I have conducted a face to face encounter with this patient in addition to the NP/PA, due to Medical Complexity    Other NP/PA Attestation Additions:      Medical Decision Makin-year-old female with history of Crohn's disease  s/p ileostomy, with recent admissions for intra-abdominal abscess over a month ago, presents with acute pain under her ostomy site for the past 2 days.  She has been compliant with all meds, but admits she ran out of her oxycodone a few days ago and is awaiting approval for refill.  On exam she has no evidence of ostomy complication or abdominal wall cellulitis.  Given risk factors, CT repeated which shows no intra-abdominal abscess present now, and no other new changes.  It does mentioned fat density peristomal hernia but this was also mention on last CT, and no sign of obstruction or incarceration associated.  CT also mentions appearance of edema or inflammation along the gluteal cleft left of midline, but on exam no sign of cellulitis and patient has no pain or symptoms there.  Her UA show signs of infection, but per chart review she always has signs of UTI with negative cultures, and patient denies any urinary symptoms now, will hold empiric antibiotics pending culture. Her pain was controlled with IV morphine in ED and she is tolerating p.o. without difficulty.  Stable for discharge, patient will discuss getting oxycodone refill with PCP tomorrow, and follow up with her GI for reassessment, and return to the ED for any worsening pain or new symptoms                    Clinical Impression:       ICD-10-CM ICD-9-CM   1. LLQ abdominal pain R10.32 789.04                                Oliva Delarosa NP  07/08/19 0047       Regan Yeh MD  07/11/19 1954

## 2019-07-08 NOTE — ED NOTES
Pt c/o LLQ pain x 18 hrs w/ hx of Crohn's, was here recently for the same c/c. Pt is A & O x 3, denies SOB, fever, chills and N/V. No respiratory distress observed. Skin is warm, dry and pink. VS. ADAN x 3mm. BBS- CTA. Abd- soft w/ diffuse tenderness to the lower abd w/ illeostomy to the L side of the abd. Pt states it feels like a hernia is emerging beneath the colostomy. PSM x 4 exts. Will continue to monitor closely.

## 2019-07-09 LAB
BACTERIA UR CULT: NORMAL
BACTERIA UR CULT: NORMAL

## 2019-07-26 ENCOUNTER — HOSPITAL ENCOUNTER (EMERGENCY)
Facility: OTHER | Age: 49
Discharge: HOME | End: 2019-07-26
Attending: EMERGENCY MEDICINE
Payer: MEDICARE

## 2019-07-26 VITALS
HEART RATE: 74 BPM | RESPIRATION RATE: 18 BRPM | TEMPERATURE: 98 F | HEIGHT: 63 IN | SYSTOLIC BLOOD PRESSURE: 129 MMHG | DIASTOLIC BLOOD PRESSURE: 75 MMHG | WEIGHT: 107 LBS | OXYGEN SATURATION: 97 % | BODY MASS INDEX: 18.96 KG/M2

## 2019-07-26 DIAGNOSIS — M79.7 FIBROMYALGIA: Primary | ICD-10-CM

## 2019-07-26 PROCEDURE — 99282 EMERGENCY DEPT VISIT SF MDM: CPT

## 2019-07-26 RX ORDER — LIDOCAINE 50 MG/G
PATCH TOPICAL
Qty: 30 PATCH | Refills: 0 | Status: SHIPPED | OUTPATIENT
Start: 2019-07-26

## 2019-07-26 NOTE — ED PROVIDER NOTES
"Encounter Date: 7/26/2019       History     Chief Complaint   Patient presents with    Flank Pain     pt came to the ED tonight c.o. left flank pain x 1 hr.      HPI   Pt presents with L chest wall at her axillary line for the past hour. She describes character is like "all my ribs are breaking" in that area, but typical of fibromyalgia pain flares. She denies change in urination, abdominal pain, fevers, chills, cough, or dyspnea. Not improved or worsened by anything but similar pain has resolved with topical lidocaine in the past. No clear inciting factor. She denies trauma, heavy lifting, unusual activity.       Review of patient's allergies indicates:   Allergen Reactions    Imuran [azathioprine sodium] Other (See Comments)     pancreatitis    Penicillins Hives    Sulfa (sulfonamide antibiotics) Nausea Only and Rash     Past Medical History:   Diagnosis Date    Arthritis     Crohn disease     Depression     Fibromyalgia     Hx of psychiatric care      Past Surgical History:   Procedure Laterality Date    ABDOMINAL SURGERY      COLON SURGERY      COLONOSCOPY/ileoscopy through stoma N/A 5/23/2019    Performed by Santiago Sims MD at [facility] ENDO    ILEOSTOMY       No family history on file.  Social History     Tobacco Use    Smoking status: Current Every Day Smoker     Packs/day: 0.25     Types: Cigarettes    Smokeless tobacco: Current User   Substance Use Topics    Alcohol use: Never     Frequency: Never    Drug use: Never     Review of Systems  ROS: As per HPI and below:   General: No fever.   HENT: No facial pain.   Eyes: Negative for eye pain.   Cardiovascular: No chest pain. Notes chest wall pain  Respiratory:  No dyspnea.   GI: No abdominal pain. No nausea. No vomiting. No diarrhea.   Skin: No rashes.   Neuro:  No syncope.  No focal deficits.   Musculoskeletal: No extremity pain.  All other systems reviewed and are negative.    Physical Exam     Initial Vitals [07/26/19 0314]   BP Pulse Resp " "Temp SpO2   126/72 72 18 98.2 °F (36.8 °C) 99 %      MAP       --         Physical Exam  Nursing note and vitals reviewed.  /75 (BP Location: Left arm, Patient Position: Sitting)   Pulse 74   Temp 98.2 °F (36.8 °C) (Oral)   Resp 18   Ht 5' 3" (1.6 m)   Wt 48.5 kg (107 lb)   LMP  (LMP Unknown)   SpO2 97%   BMI 18.95 kg/m²   Constitutional: AAOx3. No distress.  Eyes: EOMI. No discharge. Anicteric.  HENT:   Mouth/Throat: Oropharynx is clear and moist. Uvula midline.    Neck: Normal range of motion. Neck supple.  Cardiovascular: Normal rate. No murmur, no gallop and no friction rub heard.   Pulmonary/Chest: No respiratory distress. Effort normal. No wheezes, no rales, no rhonchi. No skin changes over affected area.   Abdominal: L abdominal ostomy site clean/dry/intact. Bowel sounds normal. Soft. No distension and no mass. There is no tenderness. There is no rebound, no guarding, no tenderness at McBurney's point and negative Alvarez's sign.   Musculoskeletal: Normal range of motion.   Neurological: GCS 15. Alert and oriented to person, place, and time. No gross cranial nerve, light touch or strength deficit. Coordination normal.   Skin: Skin is warm and dry.   Psychiatric: Behavior is normal. Judgment normal.    ED Course   Procedures  Labs Reviewed   URINALYSIS, REFLEX TO URINE CULTURE          Imaging Results    None                       Attending Attestation:             Attending ED Notes:   Pt is a 49 y.o. F with Crohns disease, fibromyalgia, ostomy, Hx depression who presents with vague L chest wall pain. She denies respiratory symptoms, hematuria, dysuria, changes to her ostomy output.   I discussed with the patient that Ddx includes pneumonia, nephrolithiasis, pyelonephritis which would require CXR, urinalysis at minimal for initial evaluation. Pt flatly refused, stating that she knows her body, and there is no way this is something other than fibromyalgia exacerbation.  She requested lidoderm " patch Rx, and a new ostomy bag.   I had a shared decision making conversation with the patient. The patient displays normal decision making capacity. I explained risk of declining workup at this time included delayed diagnosis of potentially serious or life threatening cause. The patient was encouraged to return at any point for continued, new, or concerning symptoms.              Clinical Impression:       ICD-10-CM ICD-9-CM   1. Fibromyalgia M79.7 729.1                                Aayush Brunson MD  07/26/19 0658

## 2019-07-26 NOTE — DISCHARGE INSTRUCTIONS
Call your primary care doctor to make the first available appointment.     Keep all your medical appointments.     Take your regular medication as prescribed. Contact your primary care provider before running out of medication, or for any problems obtaining them.    Do not drive or operate heavy machinery while taking opioid or muscle relaxing medications. Examples include norco, percocet, xanax, valium, flexeril.     Overuse or long term use of pain and sedating medication may lead to addiction, dependence, organ failure, family and work problems, legal problems, accidental overdose and death.    If you do not have health insurance, you probably qualify for heavily discounted rates:  Call 1-609.425.3547 ([facility] hotline) or go to www.[place].gov    Your evaluation in the ED does not suggest any emergent or life threatening medical condition requiring admission or immediate intervention beyond that provided in the ED.   However, the signs of a serious problem sometimes take more time to appear.   RETURN TO THE ER if any of the following occur:    Weakness, dizziness, fainting, or loss of consciousness   Fever of 100.4ºF (38ºC) or higher  Any worse symptoms  Any new or concerning symptoms

## 2019-07-26 NOTE — ED NOTES
Pt to Room 9 with c/o left rib pain. Pt states this pain is chronic, she normally takes Oxycodone but states she does not have anymore currently. Pain rated as 9/10, described as aching. Pt denies any nausea, vomiting, diarrhea, constipation, dizziness, weakness, headache, chest pain or SOB. Pt is AAO x 4. Respirations even and unlabored, lung sounds clear and equal bilaterally. Abdomen soft and non-distended, no guarding or tenderness noted. BS present x 4. Skin warm and dry to touch, no swelling noted. Pt has full ROM and able to ambulate independently. VSS. No acute distress noted. Cardiac monitor, pulse ox and BP cuff applied to patient. Bed in lowest position, call bell within reach and side rails up x 2. Will continue to monitor closely.

## 2019-08-06 ENCOUNTER — HOSPITAL ENCOUNTER (EMERGENCY)
Facility: OTHER | Age: 49
Discharge: HOME | End: 2019-08-07
Attending: EMERGENCY MEDICINE
Payer: MEDICARE

## 2019-08-06 DIAGNOSIS — R07.9 CHEST PAIN: ICD-10-CM

## 2019-08-06 DIAGNOSIS — R93.89 ABNORMAL CT OF THE CHEST: ICD-10-CM

## 2019-08-06 DIAGNOSIS — R91.1 NODULE OF RIGHT LUNG: Primary | ICD-10-CM

## 2019-08-06 DIAGNOSIS — R00.0 TACHYCARDIA: ICD-10-CM

## 2019-08-06 LAB
BASOPHILS # BLD AUTO: 0.09 K/UL (ref 0–0.2)
BASOPHILS NFR BLD: 0.7 % (ref 0–1.9)
DIFFERENTIAL METHOD: ABNORMAL
EOSINOPHIL # BLD AUTO: 0.3 K/UL (ref 0–0.5)
EOSINOPHIL NFR BLD: 2.2 % (ref 0–8)
ERYTHROCYTE [DISTWIDTH] IN BLOOD BY AUTOMATED COUNT: 13.8 % (ref 11.5–14.5)
HCT VFR BLD AUTO: 42.3 % (ref 37–48.5)
HGB BLD-MCNC: 13.6 G/DL (ref 12–16)
IMM GRANULOCYTES # BLD AUTO: 0.05 K/UL (ref 0–0.04)
IMM GRANULOCYTES NFR BLD AUTO: 0.4 % (ref 0–0.5)
LYMPHOCYTES # BLD AUTO: 3.1 K/UL (ref 1–4.8)
LYMPHOCYTES NFR BLD: 25.4 % (ref 18–48)
MCH RBC QN AUTO: 31.8 PG (ref 27–31)
MCHC RBC AUTO-ENTMCNC: 32.2 G/DL (ref 32–36)
MCV RBC AUTO: 99 FL (ref 82–98)
MONOCYTES # BLD AUTO: 0.8 K/UL (ref 0.3–1)
MONOCYTES NFR BLD: 6.5 % (ref 4–15)
NEUTROPHILS # BLD AUTO: 7.8 K/UL (ref 1.8–7.7)
NEUTROPHILS NFR BLD: 64.8 % (ref 38–73)
NRBC BLD-RTO: 0 /100 WBC
PLATELET # BLD AUTO: 345 K/UL (ref 150–350)
PMV BLD AUTO: 9.6 FL (ref 9.2–12.9)
RBC # BLD AUTO: 4.28 M/UL (ref 4–5.4)
WBC # BLD AUTO: 12.02 K/UL (ref 3.9–12.7)

## 2019-08-06 PROCEDURE — 85025 COMPLETE CBC W/AUTO DIFF WBC: CPT

## 2019-08-06 PROCEDURE — 93010 EKG 12-LEAD: ICD-10-PCS | Mod: ,,, | Performed by: INTERNAL MEDICINE

## 2019-08-06 PROCEDURE — 96375 TX/PRO/DX INJ NEW DRUG ADDON: CPT

## 2019-08-06 PROCEDURE — 96365 THER/PROPH/DIAG IV INF INIT: CPT

## 2019-08-06 PROCEDURE — 80048 BASIC METABOLIC PNL TOTAL CA: CPT

## 2019-08-06 PROCEDURE — 93005 ELECTROCARDIOGRAM TRACING: CPT

## 2019-08-06 PROCEDURE — 96366 THER/PROPH/DIAG IV INF ADDON: CPT

## 2019-08-06 PROCEDURE — 99285 EMERGENCY DEPT VISIT HI MDM: CPT | Mod: 25

## 2019-08-06 PROCEDURE — 93010 ELECTROCARDIOGRAM REPORT: CPT | Mod: ,,, | Performed by: INTERNAL MEDICINE

## 2019-08-06 PROCEDURE — 85379 FIBRIN DEGRADATION QUANT: CPT

## 2019-08-06 RX ORDER — LEVOFLOXACIN 5 MG/ML
750 INJECTION, SOLUTION INTRAVENOUS
Status: COMPLETED | OUTPATIENT
Start: 2019-08-07 | End: 2019-08-07

## 2019-08-07 VITALS
TEMPERATURE: 98 F | DIASTOLIC BLOOD PRESSURE: 70 MMHG | WEIGHT: 103.81 LBS | OXYGEN SATURATION: 99 % | SYSTOLIC BLOOD PRESSURE: 111 MMHG | HEIGHT: 60 IN | RESPIRATION RATE: 15 BRPM | HEART RATE: 74 BPM | BODY MASS INDEX: 20.38 KG/M2

## 2019-08-07 LAB
ANION GAP SERPL CALC-SCNC: 15 MMOL/L (ref 8–16)
BUN SERPL-MCNC: 13 MG/DL (ref 6–20)
CALCIUM SERPL-MCNC: 10 MG/DL (ref 8.7–10.5)
CHLORIDE SERPL-SCNC: 102 MMOL/L (ref 95–110)
CO2 SERPL-SCNC: 25 MMOL/L (ref 23–29)
CREAT SERPL-MCNC: 1.1 MG/DL (ref 0.5–1.4)
D DIMER PPP IA.FEU-MCNC: 0.19 MG/L FEU
EST. GFR  (AFRICAN AMERICAN): >60 ML/MIN/1.73 M^2
EST. GFR  (NON AFRICAN AMERICAN): 60 ML/MIN/1.73 M^2
GLUCOSE SERPL-MCNC: 161 MG/DL (ref 70–110)
LACTATE SERPL-SCNC: 1.9 MMOL/L (ref 0.5–2.2)
POTASSIUM SERPL-SCNC: 3.9 MMOL/L (ref 3.5–5.1)
SODIUM SERPL-SCNC: 142 MMOL/L (ref 136–145)

## 2019-08-07 PROCEDURE — 83605 ASSAY OF LACTIC ACID: CPT

## 2019-08-07 PROCEDURE — 63600175 PHARM REV CODE 636 W HCPCS: Performed by: EMERGENCY MEDICINE

## 2019-08-07 PROCEDURE — 87040 BLOOD CULTURE FOR BACTERIA: CPT

## 2019-08-07 RX ORDER — ONDANSETRON 2 MG/ML
4 INJECTION INTRAMUSCULAR; INTRAVENOUS
Status: COMPLETED | OUTPATIENT
Start: 2019-08-07 | End: 2019-08-07

## 2019-08-07 RX ADMIN — LEVOFLOXACIN 750 MG: 750 INJECTION, SOLUTION INTRAVENOUS at 12:08

## 2019-08-07 RX ADMIN — ONDANSETRON 4 MG: 2 INJECTION INTRAMUSCULAR; INTRAVENOUS at 03:08

## 2019-08-07 RX ADMIN — SODIUM CHLORIDE 1413 ML: 0.9 INJECTION, SOLUTION INTRAVENOUS at 12:08

## 2019-08-07 NOTE — ED PROVIDER NOTES
Encounter Date: 8/6/2019       History     Chief Complaint   Patient presents with    Chest Pain     when breathing deep, worse w/ movement x 4 days, denies cough, chills, V/D     49-year-old female presents complaining of left-sided sharp chest pain with deep breathing.  Patient states that the pain has been present since August 1st when she was seen at an outside hospital and diagnosed with a pneumonia.  Patient states that the pain worsened today.  She reports subjective fever earlier today.  She denies any current shortness of breath, nausea, myalgias or malaise.  She states she has filled the prescription for antibiotics that she was given other hospital because she is homeless.        Review of patient's allergies indicates:   Allergen Reactions    Imuran [azathioprine sodium] Other (See Comments)     pancreatitis    Penicillins Hives    Sulfa (sulfonamide antibiotics) Nausea Only and Rash     Past Medical History:   Diagnosis Date    Arthritis     Crohn disease     Depression     Fibromyalgia     Hx of psychiatric care      Past Surgical History:   Procedure Laterality Date    ABDOMINAL SURGERY      COLON SURGERY      COLONOSCOPY/ileoscopy through stoma N/A 5/23/2019    [facility] at [facility] ENDO    ILEOSTOMY       History reviewed. No pertinent family history.  Social History     Tobacco Use    Smoking status: Current Every Day Smoker     Packs/day: 0.25     Types: Cigarettes    Smokeless tobacco: Current User   Substance Use Topics    Alcohol use: Never     Frequency: Never    Drug use: Never     Review of Systems   Constitutional: Positive for fever. Negative for chills.   Respiratory: Positive for cough. Negative for chest tightness, shortness of breath and wheezing.    Cardiovascular: Positive for chest pain.   Gastrointestinal: Negative for nausea and vomiting.   Genitourinary: Negative for dysuria.   Musculoskeletal: Negative for back pain.   Neurological: Negative for  dizziness and weakness.       Physical Exam     Initial Vitals [08/06/19 2312]   BP Pulse Resp Temp SpO2   (!) 99/58 (!) 121 20 98.5 °F (36.9 °C) 96 %      MAP       --         Physical Exam    Constitutional: She appears well-developed and well-nourished. She is not diaphoretic. No distress.   HENT:   Head: Normocephalic and atraumatic.   Right Ear: External ear normal.   Left Ear: External ear normal.   Eyes: Conjunctivae and EOM are normal.   Neck: Neck supple.   Cardiovascular: Normal rate, regular rhythm and normal heart sounds.   Pulmonary/Chest: No respiratory distress. She has no wheezes. She has no rhonchi. She has no rales.   Neurological: She is alert and oriented to person, place, and time.   Skin: Skin is warm and dry.   Psychiatric: She has a normal mood and affect. Her behavior is normal. Thought content normal.         ED Course   Procedures  Labs Reviewed   CBC W/ AUTO DIFFERENTIAL - Abnormal; Notable for the following components:       Result Value    Mean Corpuscular Volume 99 (*)     Mean Corpuscular Hemoglobin 31.8 (*)     Gran # (ANC) 7.8 (*)     Immature Grans (Abs) 0.05 (*)     All other components within normal limits   BASIC METABOLIC PANEL - Abnormal; Notable for the following components:    Glucose 161 (*)     All other components within normal limits   CULTURE, BLOOD   CULTURE, BLOOD   D DIMER, QUANTITATIVE   LACTIC ACID, PLASMA          Imaging Results          X-Ray Chest PA And Lateral (Final result)  Result time 08/06/19 23:57:20    Final result by Harley Harden MD (08/06/19 23:57:20)                 Impression:      Nodular opacity in the right lung apex.  Otherwise, stable examination.      Electronically signed by: Harley Harden MD  Date:    08/06/2019  Time:    23:57             Narrative:    EXAMINATION:  XR CHEST PA AND LATERAL    CLINICAL HISTORY:  Chest pain, unspecified    TECHNIQUE:  PA and lateral views of the chest were  performed.    COMPARISON:  04/20/2019.    FINDINGS:  Monitoring EKG leads are present.  There is unchanged appearance of a left-sided chest port with its tip in the SVC.    The trachea is unremarkable.  The cardiomediastinal silhouette is within normal limits.  The hemidiaphragms are unremarkable.  There is no evidence of free air beneath the hemidiaphragms.  There are no pleural effusions.  There is no evidence of a pneumothorax.  There is no evidence of pneumomediastinum.  There is a nodular opacity in the right lung apex.  There are chronic interstitial changes.  The osseous structures demonstrate degenerative changes.                                    Additional MDM:   Comments: Patient presents afebrile well-appearing.  Initial vital significant for tachycardia hypotension.  Blood pressure did improve without any interventions upon repeat once room.  Physical exam is unremarkable. Will repeat chest x-ray obtain labs.  Will also give dose Levaquin while awaiting results.    3:47 AM  Lab significant for normal white blood cell count, normal lactic acid and normal D-dimer.  Chest x-ray did not show infectious process.  I did review her records from [facility].  During her visit 6 days ago she was diagnosed with a bacterial pneumonia as well as a urinary tract infection.  Chest x-ray was interpreted as an ill-defined opacity in the right upper lobe and a CT of the chest was recommended for further evaluation.  Therefore, I did obtain a CT of the chest tonight which shows a spiculated nodule but no evidence of an infectious process.  Patient also denies any urinary symptoms. She did receive a dose of Rocephin during his previous visit.    Results of the CTas well as the importance of having short-term follow-up of this spiculated nodule were discussed with the patient.  Explained to her that based on the results we have today I do not think she needs to antibiotics at this time.  The patient was  encouraged to her care doctor for re-evaluation of her symptoms and repeat imaging of the nodule seen today..                    Clinical Impression:     1. Chest pain    2. Tachycardia                                 Ana Maria Lezama MD  08/07/19 0358

## 2019-08-07 NOTE — DISCHARGE INSTRUCTIONS
Your chest CT today shows a nodule in the right upper lung that requires close monitoring.  Radiology recommends repeat CT in 3 months.

## 2019-08-12 LAB
BACTERIA BLD CULT: NORMAL
BACTERIA BLD CULT: NORMAL

## 2022-08-01 NOTE — PLAN OF CARE
CM met with pt for initial discharge planning assessment.    Pt confirmed her PCP is correct in [facility], and pharmacy of choice is [facility]. CM to correct in Epic if necessary.    Pt states she lives alone, and has no DME needs.    Pt may need a medicaid ride home at time of discharge.     05/22/19 1047   Discharge Assessment   Assessment Type Discharge Planning Assessment   Confirmed/corrected address and phone number on facesheet? Yes   Assessment information obtained from? Patient;Medical Record   Expected Length of Stay (days) 3   Communicated expected length of stay with patient/caregiver yes   Prior to hospitilization cognitive status: Alert/Oriented   Prior to hospitalization functional status: Independent   Current cognitive status: Alert/Oriented   Current Functional Status: Independent   Lives With alone   Able to Return to Prior Arrangements yes   Is patient able to care for self after discharge? Yes   Patient's perception of discharge disposition home or selfcare   Readmission Within the Last 30 Days no previous admission in last 30 days   Patient currently being followed by outpatient case management? No   Patient currently receives any other outside agency services? No   Equipment Currently Used at Home none   Do you have any problems affording any of your prescribed medications? No   Is the patient taking medications as prescribed? yes   Does the patient have transportation home? Yes   Transportation Anticipated public transportation   Does the patient receive services at the [facility]? No   Discharge Plan A Home   Discharge Plan B Home   DME Needed Upon Discharge  none   Patient/Family in Agreement with Plan yes     
Leary explained to pt prior to her signing.     05/22/19 1051   LEARY Message   Medicare Outpatient and Observation Notification regarding financial responsibility Given to patient/caregiver;Explained to patient/caregiver;Signed/date by patient/caregiver   Date LEARY was signed 05/22/19   Time LEARY was signed 0900     
Plan of care reviewed with patient. Pt NPO for procedure except for GoLytely solution. Pt able to finish half of solution, but reported nausea and vomiting. Ostomy bag reveals milky brown liquid stool. Pain managed with PRN IV medication. Pt currently resting in NAD. Safety measures maintained. Will continue to monitor.  
Problem: Adult Inpatient Plan of Care  Goal: Plan of Care Review  Recommendation: When medically feasible, advance diet as tolerated to regular with Boost Plus (strawberry) tid     Goals:   1. Meet >85% EEN and EPN daily   2. Prevent wt loss of >2% per week   3. Promote nutrition related labs wnl      
No assistance needed